# Patient Record
Sex: FEMALE | Race: WHITE | NOT HISPANIC OR LATINO | Employment: OTHER | URBAN - NONMETROPOLITAN AREA
[De-identification: names, ages, dates, MRNs, and addresses within clinical notes are randomized per-mention and may not be internally consistent; named-entity substitution may affect disease eponyms.]

---

## 2017-09-12 ENCOUNTER — APPOINTMENT (OUTPATIENT)
Dept: GENERAL RADIOLOGY | Facility: HOSPITAL | Age: 37
End: 2017-09-12

## 2017-09-12 ENCOUNTER — HOSPITAL ENCOUNTER (EMERGENCY)
Facility: HOSPITAL | Age: 37
Discharge: HOME | End: 2017-09-12
Attending: EMERGENCY MEDICINE | Admitting: EMERGENCY MEDICINE

## 2017-09-12 VITALS
WEIGHT: 130 LBS | BODY MASS INDEX: 22.2 KG/M2 | OXYGEN SATURATION: 100 % | RESPIRATION RATE: 17 BRPM | SYSTOLIC BLOOD PRESSURE: 124 MMHG | HEIGHT: 64 IN | TEMPERATURE: 98.4 F | DIASTOLIC BLOOD PRESSURE: 77 MMHG | HEART RATE: 74 BPM

## 2017-09-12 DIAGNOSIS — M54.12 CERVICAL RADICULOPATHY: Primary | ICD-10-CM

## 2017-09-12 PROCEDURE — 72050 X-RAY EXAM NECK SPINE 4/5VWS: CPT

## 2017-09-12 PROCEDURE — 99282 EMERGENCY DEPT VISIT SF MDM: CPT

## 2017-09-12 PROCEDURE — 73030 X-RAY EXAM OF SHOULDER: CPT | Performed by: RADIOLOGY

## 2017-09-12 PROCEDURE — 73030 X-RAY EXAM OF SHOULDER: CPT

## 2017-09-12 PROCEDURE — 72052 X-RAY EXAM NECK SPINE 6/>VWS: CPT | Performed by: RADIOLOGY

## 2017-09-12 RX ORDER — MONTELUKAST SODIUM 10 MG/1
10 TABLET ORAL NIGHTLY
COMMUNITY
End: 2019-09-06

## 2017-09-12 RX ORDER — HYDROCODONE BITARTRATE AND ACETAMINOPHEN 7.5; 325 MG/1; MG/1
1 TABLET ORAL EVERY 6 HOURS PRN
COMMUNITY
End: 2022-12-15 | Stop reason: ALTCHOICE

## 2017-09-12 RX ORDER — KETOROLAC TROMETHAMINE 10 MG/1
10 TABLET, FILM COATED ORAL EVERY 6 HOURS PRN
Qty: 20 TABLET | Refills: 0 | Status: SHIPPED | OUTPATIENT
Start: 2017-09-12 | End: 2018-07-05

## 2017-09-12 RX ORDER — OMEPRAZOLE 40 MG/1
40 CAPSULE, DELAYED RELEASE ORAL DAILY
COMMUNITY
End: 2022-09-09

## 2017-09-12 RX ORDER — AMITRIPTYLINE HYDROCHLORIDE 25 MG/1
25 TABLET, FILM COATED ORAL NIGHTLY
COMMUNITY
End: 2019-09-06

## 2017-09-12 RX ORDER — PROMETHAZINE HYDROCHLORIDE 12.5 MG/1
12.5 TABLET ORAL EVERY 6 HOURS PRN
COMMUNITY
End: 2018-11-16 | Stop reason: SDUPTHER

## 2017-09-12 RX ORDER — CYCLOBENZAPRINE HCL 10 MG
10 TABLET ORAL 3 TIMES DAILY PRN
COMMUNITY
End: 2019-09-06

## 2017-09-12 RX ORDER — CETIRIZINE HYDROCHLORIDE 10 MG/1
10 TABLET ORAL DAILY
COMMUNITY
End: 2019-09-06

## 2017-09-12 NOTE — DISCHARGE INSTRUCTIONS

## 2017-09-12 NOTE — ED PROVIDER NOTES
Subjective   Patient is a 37 y.o. female presenting with shoulder problem.   Shoulder Problem   Location:  Shoulder  Shoulder location:  R shoulder  Injury: no    Pain details:     Quality:  Aching    Severity:  Moderate    Onset quality:  Gradual    Duration:  1 day    Timing:  Constant    Progression:  Unchanged  Dislocation: no    Foreign body present:  No foreign bodies  Relieved by:  Nothing  Worsened by:  Nothing  Associated symptoms: decreased range of motion    Associated symptoms: no back pain and no fever    Associated symptoms comment:  She denies any injury.  She states that she does hurt with movement.  She has a history of tendinitis in the shoulder.  She states that when she raises her arm she does get numbness down to her forearm region now.  She's never had a history of neck injury.  No other complaints.  No fevers.  No neck injury.      Review of Systems   Constitutional: Negative.  Negative for fever.   HENT: Negative.    Respiratory: Negative.    Cardiovascular: Negative.  Negative for chest pain.   Gastrointestinal: Negative.  Negative for abdominal pain.   Endocrine: Negative.    Genitourinary: Negative.  Negative for dysuria.   Musculoskeletal: Negative for back pain.   Skin: Negative.    Neurological: Negative.    Psychiatric/Behavioral: Negative.    All other systems reviewed and are negative.      Past Medical History:   Diagnosis Date   • Scoliosis        Allergies   Allergen Reactions   • Anaprox [Naproxen Sodium]    • Codeine    • Combivent [Ipratropium-Albuterol]    • Compazine [Prochlorperazine Edisylate]    • Dilantin [Phenytoin Sodium Extended]    • Divalproex Sodium    • Erythromycin    • Fluticasone    • Imitrex [Sumatriptan]    • Keflex [Cephalexin]    • Morphine And Related    • Naproxen    • Penicillins    • Protonix [Pantoprazole Sodium]    • Tetracyclines & Related    • Topamax [Topiramate]        History reviewed. No pertinent surgical history.    History reviewed. No  pertinent family history.    Social History     Social History   • Marital status: Single     Spouse name: N/A   • Number of children: N/A   • Years of education: N/A     Social History Main Topics   • Smoking status: Never Smoker   • Smokeless tobacco: None   • Alcohol use No   • Drug use: No   • Sexual activity: Defer     Other Topics Concern   • None     Social History Narrative   • None           Objective   Physical Exam   Constitutional: She is oriented to person, place, and time. She appears well-developed and well-nourished. No distress.   HENT:   Head: Normocephalic and atraumatic.   Right Ear: External ear normal.   Left Ear: External ear normal.   Nose: Nose normal.   Eyes: Conjunctivae and EOM are normal. Pupils are equal, round, and reactive to light.   Neck: Normal range of motion. Neck supple. No JVD present. No tracheal deviation present.   Cardiovascular: Normal rate, regular rhythm and normal heart sounds.    No murmur heard.  Pulmonary/Chest: Effort normal and breath sounds normal. No respiratory distress. She has no wheezes.   Abdominal: Soft. Bowel sounds are normal. There is no tenderness.   Musculoskeletal: Normal range of motion. She exhibits tenderness (over her right anterior shoulder.). She exhibits no edema or deformity.   Neurological: She is alert and oriented to person, place, and time. No cranial nerve deficit.   Skin: Skin is warm and dry. No rash noted. She is not diaphoretic. No erythema. No pallor.   Psychiatric: She has a normal mood and affect. Her behavior is normal. Thought content normal.   Nursing note and vitals reviewed.      Procedures         ED Course  ED Course                  MDM  Number of Diagnoses or Management Options  new and requires workup     Amount and/or Complexity of Data Reviewed  Tests in the radiology section of CPT®: ordered and reviewed  Discuss the patient with other providers: yes  Independent visualization of images, tracings, or specimens:  yes    Risk of Complications, Morbidity, and/or Mortality  Presenting problems: moderate        Final diagnoses:   Cervical radiculopathy            JUAN ANTONIO Monroy  09/12/17 5259

## 2017-09-12 NOTE — ED NOTES
Patient reports right shoulder pain for about a week. Denies injury of any kind. Has a history of tendonitis in the shoulder. States her arm goes numb sometimes from the neck to just below her elbow. Rates pain at 8. Family at bedside.     Carlotta Diaz RN  09/12/17 1737

## 2018-07-05 ENCOUNTER — HOSPITAL ENCOUNTER (EMERGENCY)
Facility: HOSPITAL | Age: 38
Discharge: HOME | End: 2018-07-05
Admitting: FAMILY MEDICINE

## 2018-07-05 VITALS
HEIGHT: 64 IN | BODY MASS INDEX: 21.68 KG/M2 | WEIGHT: 127 LBS | SYSTOLIC BLOOD PRESSURE: 105 MMHG | OXYGEN SATURATION: 99 % | DIASTOLIC BLOOD PRESSURE: 68 MMHG | TEMPERATURE: 98 F | HEART RATE: 70 BPM | RESPIRATION RATE: 18 BRPM

## 2018-07-05 DIAGNOSIS — N39.0 ACUTE UTI: Primary | ICD-10-CM

## 2018-07-05 LAB
ALBUMIN SERPL-MCNC: 4.4 G/DL (ref 3.5–5)
ALBUMIN/GLOB SERPL: 1.7 G/DL (ref 1.5–2.5)
ALP SERPL-CCNC: 83 U/L (ref 35–104)
ALT SERPL W P-5'-P-CCNC: 15 U/L (ref 10–36)
ANION GAP SERPL CALCULATED.3IONS-SCNC: 4.2 MMOL/L (ref 3.6–11.2)
AST SERPL-CCNC: 15 U/L (ref 10–30)
BACTERIA UR QL AUTO: ABNORMAL /HPF
BASOPHILS # BLD AUTO: 0.01 10*3/MM3 (ref 0–0.3)
BASOPHILS NFR BLD AUTO: 0.1 % (ref 0–2)
BILIRUB SERPL-MCNC: 0.4 MG/DL (ref 0.2–1.8)
BILIRUB UR QL STRIP: NEGATIVE
BUN BLD-MCNC: 6 MG/DL (ref 7–21)
BUN/CREAT SERPL: 8.3 (ref 7–25)
CALCIUM SPEC-SCNC: 9.3 MG/DL (ref 7.7–10)
CHLORIDE SERPL-SCNC: 108 MMOL/L (ref 99–112)
CLARITY UR: ABNORMAL
CO2 SERPL-SCNC: 27.8 MMOL/L (ref 24.3–31.9)
COLOR UR: ABNORMAL
CREAT BLD-MCNC: 0.72 MG/DL (ref 0.43–1.29)
DEPRECATED RDW RBC AUTO: 43.7 FL (ref 37–54)
EOSINOPHIL # BLD AUTO: 0.01 10*3/MM3 (ref 0–0.7)
EOSINOPHIL NFR BLD AUTO: 0.1 % (ref 0–5)
ERYTHROCYTE [DISTWIDTH] IN BLOOD BY AUTOMATED COUNT: 13.4 % (ref 11.5–14.5)
GFR SERPL CREATININE-BSD FRML MDRD: 92 ML/MIN/1.73
GLOBULIN UR ELPH-MCNC: 2.6 GM/DL
GLUCOSE BLD-MCNC: 111 MG/DL (ref 70–110)
GLUCOSE UR STRIP-MCNC: NEGATIVE MG/DL
HCT VFR BLD AUTO: 38.1 % (ref 37–47)
HGB BLD-MCNC: 12.4 G/DL (ref 12–16)
HGB UR QL STRIP.AUTO: ABNORMAL
HYALINE CASTS UR QL AUTO: ABNORMAL /LPF
IMM GRANULOCYTES # BLD: 0.06 10*3/MM3 (ref 0–0.03)
IMM GRANULOCYTES NFR BLD: 0.3 % (ref 0–0.5)
KETONES UR QL STRIP: ABNORMAL
LEUKOCYTE ESTERASE UR QL STRIP.AUTO: ABNORMAL
LYMPHOCYTES # BLD AUTO: 1.03 10*3/MM3 (ref 1–3)
LYMPHOCYTES NFR BLD AUTO: 6 % (ref 21–51)
MCH RBC QN AUTO: 29.9 PG (ref 27–33)
MCHC RBC AUTO-ENTMCNC: 32.5 G/DL (ref 33–37)
MCV RBC AUTO: 91.8 FL (ref 80–94)
MONOCYTES # BLD AUTO: 0.53 10*3/MM3 (ref 0.1–0.9)
MONOCYTES NFR BLD AUTO: 3.1 % (ref 0–10)
NEUTROPHILS # BLD AUTO: 15.67 10*3/MM3 (ref 1.4–6.5)
NEUTROPHILS NFR BLD AUTO: 90.4 % (ref 30–70)
NITRITE UR QL STRIP: NEGATIVE
OSMOLALITY SERPL CALC.SUM OF ELEC: 277.7 MOSM/KG (ref 273–305)
PH UR STRIP.AUTO: <=5 [PH] (ref 5–8)
PLATELET # BLD AUTO: 291 10*3/MM3 (ref 130–400)
PMV BLD AUTO: 10 FL (ref 6–10)
POTASSIUM BLD-SCNC: 4 MMOL/L (ref 3.5–5.3)
PROT SERPL-MCNC: 7 G/DL (ref 6–8)
PROT UR QL STRIP: ABNORMAL
RBC # BLD AUTO: 4.15 10*6/MM3 (ref 4.2–5.4)
RBC # UR: ABNORMAL /HPF
REF LAB TEST METHOD: ABNORMAL
SODIUM BLD-SCNC: 140 MMOL/L (ref 135–153)
SP GR UR STRIP: 1.02 (ref 1–1.03)
SQUAMOUS #/AREA URNS HPF: ABNORMAL /HPF
UROBILINOGEN UR QL STRIP: ABNORMAL
WBC NRBC COR # BLD: 17.31 10*3/MM3 (ref 4.5–12.5)
WBC UR QL AUTO: ABNORMAL /HPF

## 2018-07-05 PROCEDURE — 80053 COMPREHEN METABOLIC PANEL: CPT | Performed by: NURSE PRACTITIONER

## 2018-07-05 PROCEDURE — 85025 COMPLETE CBC W/AUTO DIFF WBC: CPT | Performed by: NURSE PRACTITIONER

## 2018-07-05 PROCEDURE — 99284 EMERGENCY DEPT VISIT MOD MDM: CPT

## 2018-07-05 PROCEDURE — P9612 CATHETERIZE FOR URINE SPEC: HCPCS

## 2018-07-05 PROCEDURE — 36415 COLL VENOUS BLD VENIPUNCTURE: CPT

## 2018-07-05 PROCEDURE — 81001 URINALYSIS AUTO W/SCOPE: CPT | Performed by: NURSE PRACTITIONER

## 2018-07-05 RX ORDER — BUTALBITAL, ACETAMINOPHEN AND CAFFEINE 50; 325; 40 MG/1; MG/1; MG/1
1 TABLET ORAL EVERY 4 HOURS PRN
COMMUNITY
End: 2019-09-06

## 2018-07-05 RX ORDER — LEVOFLOXACIN 500 MG/1
500 TABLET, FILM COATED ORAL DAILY
Qty: 9 TABLET | Refills: 0 | Status: SHIPPED | OUTPATIENT
Start: 2018-07-06 | End: 2018-07-15

## 2018-07-05 RX ORDER — LEVOFLOXACIN 750 MG/1
750 TABLET ORAL ONCE
Status: COMPLETED | OUTPATIENT
Start: 2018-07-05 | End: 2018-07-05

## 2018-07-05 RX ADMIN — LEVOFLOXACIN 750 MG: 750 TABLET, FILM COATED ORAL at 18:42

## 2018-07-05 NOTE — ED NOTES
Straight Cath done to obtain UA sample; 2ml cloudy urine return noted; patient tolerated well.     Viola Patiño RN  07/05/18 0378

## 2018-07-23 NOTE — ED PROVIDER NOTES
Subjective     History provided by:  Patient   used: No    Female  Problem   Primary symptoms include dysuria. The maximum temperature recorded prior to her arrival was 101 to 101.9 F. The fever has been present for 1 to 2 days. This is a recurrent problem. The current episode started more than 2 days ago. The problem occurs constantly. The problem has not changed since onset.The symptoms occur during urination. She is not pregnant. The discharge was normal. Associated symptoms include nausea. Pertinent negatives include no anorexia, no abdominal swelling, no abdominal pain, no diarrhea, no frequency, no light-headedness and no dizziness. She has tried acetaminophen for the symptoms. The treatment provided no relief. Sexual activity: sexually active. She uses nothing for contraception. Associated medical issues do not include PID, herpes simplex, perineal abscess, vaginosis, hypermenorrhea, ovarian cysts, endometriosis, ectopic pregnancy,  section or miscarriage.       Review of Systems   Constitutional: Positive for fever.   HENT: Negative.    Eyes: Negative.    Respiratory: Negative.    Gastrointestinal: Positive for nausea. Negative for abdominal pain, anorexia and diarrhea.   Endocrine: Negative.    Genitourinary: Positive for dysuria. Negative for frequency.   Musculoskeletal: Negative.    Skin: Negative.    Allergic/Immunologic: Negative.    Neurological: Negative.  Negative for dizziness and light-headedness.   Hematological: Negative.    Psychiatric/Behavioral: Negative.        Past Medical History:   Diagnosis Date   • Injury of back    • Scoliosis        Allergies   Allergen Reactions   • Anaprox [Naproxen Sodium]    • Codeine    • Combivent [Ipratropium-Albuterol]    • Compazine [Prochlorperazine Edisylate]    • Dilantin [Phenytoin Sodium Extended]    • Divalproex Sodium    • Erythromycin    • Fluticasone    • Imitrex [Sumatriptan]    • Keflex [Cephalexin]    • Morphine And  Related    • Naproxen    • Penicillins    • Protonix [Pantoprazole Sodium]    • Tetracyclines & Related    • Topamax [Topiramate]        History reviewed. No pertinent surgical history.    History reviewed. No pertinent family history.    Social History     Social History   • Marital status: Single     Social History Main Topics   • Smoking status: Current Every Day Smoker     Packs/day: 0.50     Types: Cigarettes   • Smokeless tobacco: Never Used   • Alcohol use No   • Drug use: No   • Sexual activity: Defer     Other Topics Concern   • Not on file           Objective   Physical Exam   Constitutional: She is oriented to person, place, and time. She appears well-developed and well-nourished.   HENT:   Head: Normocephalic.   Right Ear: External ear normal.   Left Ear: External ear normal.   Mouth/Throat: Oropharynx is clear and moist.   Eyes: Pupils are equal, round, and reactive to light. EOM are normal.   Neck: Normal range of motion. Neck supple.   Cardiovascular: Normal rate, regular rhythm and normal heart sounds.    Pulmonary/Chest: Effort normal and breath sounds normal.   Abdominal: Soft. Bowel sounds are normal.   Musculoskeletal: Normal range of motion.   Neurological: She is alert and oriented to person, place, and time.   Skin: Skin is warm and dry. Capillary refill takes less than 2 seconds.   Psychiatric: She has a normal mood and affect. Her behavior is normal.   Nursing note and vitals reviewed.      Procedures           ED Course                  MDM      Final diagnoses:   Acute UTI            Elio Fenton, APRN  07/22/18 4157

## 2018-09-08 ENCOUNTER — HOSPITAL ENCOUNTER (EMERGENCY)
Facility: HOSPITAL | Age: 38
Discharge: HOME | End: 2018-09-08
Attending: EMERGENCY MEDICINE | Admitting: EMERGENCY MEDICINE

## 2018-09-08 VITALS
BODY MASS INDEX: 22.2 KG/M2 | HEART RATE: 68 BPM | WEIGHT: 130 LBS | RESPIRATION RATE: 18 BRPM | HEIGHT: 64 IN | DIASTOLIC BLOOD PRESSURE: 72 MMHG | OXYGEN SATURATION: 100 % | TEMPERATURE: 98.1 F | SYSTOLIC BLOOD PRESSURE: 112 MMHG

## 2018-09-08 DIAGNOSIS — M25.532 LEFT WRIST PAIN: ICD-10-CM

## 2018-09-08 DIAGNOSIS — M25.511 RIGHT SHOULDER PAIN, UNSPECIFIED CHRONICITY: Primary | ICD-10-CM

## 2018-09-08 PROCEDURE — 25010000002 KETOROLAC TROMETHAMINE PER 15 MG: Performed by: PHYSICIAN ASSISTANT

## 2018-09-08 PROCEDURE — 96372 THER/PROPH/DIAG INJ SC/IM: CPT

## 2018-09-08 PROCEDURE — 99283 EMERGENCY DEPT VISIT LOW MDM: CPT

## 2018-09-08 PROCEDURE — 25010000002 METHYLPREDNISOLONE PER 125 MG: Performed by: PHYSICIAN ASSISTANT

## 2018-09-08 RX ORDER — METHYLPREDNISOLONE SODIUM SUCCINATE 125 MG/2ML
80 INJECTION, POWDER, LYOPHILIZED, FOR SOLUTION INTRAMUSCULAR; INTRAVENOUS ONCE
Status: COMPLETED | OUTPATIENT
Start: 2018-09-08 | End: 2018-09-08

## 2018-09-08 RX ORDER — KETOROLAC TROMETHAMINE 30 MG/ML
60 INJECTION, SOLUTION INTRAMUSCULAR; INTRAVENOUS ONCE
Status: COMPLETED | OUTPATIENT
Start: 2018-09-08 | End: 2018-09-08

## 2018-09-08 RX ADMIN — METHYLPREDNISOLONE SODIUM SUCCINATE 80 MG: 125 INJECTION, POWDER, FOR SOLUTION INTRAMUSCULAR; INTRAVENOUS at 10:32

## 2018-09-08 RX ADMIN — KETOROLAC TROMETHAMINE 60 MG: 60 INJECTION, SOLUTION INTRAMUSCULAR at 10:32

## 2018-09-08 NOTE — ED PROVIDER NOTES
Subjective     History provided by:  Patient   used: No    Upper Extremity Issue   Location:  Shoulder and wrist  Shoulder location:  R shoulder  Wrist location:  L wrist  Injury: no    Pain details:     Quality:  Aching    Radiates to:  Does not radiate    Severity:  Mild    Onset quality:  Sudden    Duration:  1 week    Timing:  Intermittent    Progression:  Waxing and waning  Dislocation: no    Prior injury to area:  No  Relieved by:  None tried  Worsened by:  Movement  Ineffective treatments:  None tried  Associated symptoms: no decreased range of motion, no fever, no numbness, no stiffness, no swelling and no tingling    Risk factors: no concern for non-accidental trauma and no known bone disorder        Review of Systems   Constitutional: Negative for activity change, chills and fever.   HENT: Negative for congestion, rhinorrhea and sore throat.    Eyes: Negative for pain and redness.   Respiratory: Negative for cough, shortness of breath and wheezing.    Cardiovascular: Negative for chest pain.   Gastrointestinal: Negative for abdominal distention, diarrhea, nausea and vomiting.   Endocrine: Negative for polyphagia and polyuria.   Genitourinary: Negative for decreased urine volume, difficulty urinating and dysuria.   Musculoskeletal: Negative for arthralgias, myalgias and stiffness.   Skin: Negative for rash and wound.   Allergic/Immunologic: Negative for food allergies and immunocompromised state.   Neurological: Negative for dizziness and headaches.   Hematological: Negative for adenopathy. Does not bruise/bleed easily.   Psychiatric/Behavioral: Negative for agitation and confusion.   All other systems reviewed and are negative.      Past Medical History:   Diagnosis Date   • Injury of back    • Scoliosis        Allergies   Allergen Reactions   • Anaprox [Naproxen Sodium]    • Codeine    • Combivent [Ipratropium-Albuterol]    • Compazine [Prochlorperazine Edisylate]    • Dilantin  [Phenytoin Sodium Extended]    • Divalproex Sodium    • Erythromycin    • Fluticasone    • Imitrex [Sumatriptan]    • Keflex [Cephalexin]    • Morphine And Related    • Naproxen    • Penicillins    • Protonix [Pantoprazole Sodium]    • Robaxin [Methocarbamol] Hives   • Tetracyclines & Related    • Topamax [Topiramate]        No past surgical history on file.    No family history on file.    Social History     Social History   • Marital status: Single     Social History Main Topics   • Smoking status: Current Every Day Smoker     Packs/day: 0.50     Types: Cigarettes   • Smokeless tobacco: Never Used   • Alcohol use No   • Drug use: No   • Sexual activity: Defer     Other Topics Concern   • Not on file           Objective   Physical Exam   Constitutional: She is oriented to person, place, and time. She appears well-developed and well-nourished.   HENT:   Head: Normocephalic and atraumatic.   Eyes: Pupils are equal, round, and reactive to light. EOM are normal.   Neck: Normal range of motion. Neck supple.   Cardiovascular: Normal rate, regular rhythm and normal heart sounds.    Pulmonary/Chest: Effort normal and breath sounds normal.   Abdominal: Soft. Bowel sounds are normal.   Musculoskeletal: Normal range of motion.        Right shoulder: She exhibits pain. She exhibits normal range of motion, no tenderness and no bony tenderness.        Left wrist: She exhibits tenderness. She exhibits normal range of motion and no bony tenderness.   Neurological: She is alert and oriented to person, place, and time.   Skin: Skin is warm and dry.   Psychiatric: She has a normal mood and affect. Her behavior is normal. Judgment and thought content normal.   Nursing note and vitals reviewed.      Splint - Cast - Strapping  Date/Time: 9/8/2018 10:40 AM  Performed by: DAVID TOLBERT  Authorized by: GREG CARDONA     Consent:     Consent obtained:  Verbal    Consent given by:  Patient    Risks discussed:  Discoloration,  numbness, pain and swelling    Alternatives discussed:  No treatment  Pre-procedure details:     Sensation:  Normal  Procedure details:     Laterality:  Left    Location:  Wrist    Wrist:  L wrist    Splint type:  Wrist    Supplies:  Prefabricated splint  Post-procedure details:     Pain:  Improved    Sensation:  Normal    Patient tolerance of procedure:  Tolerated well, no immediate complications               ED Course                  MDM  Number of Diagnoses or Management Options     Amount and/or Complexity of Data Reviewed  Clinical lab tests: reviewed and ordered  Tests in the radiology section of CPT®: ordered and reviewed  Tests in the medicine section of CPT®: ordered and reviewed    Patient Progress  Patient progress: stable        Final diagnoses:   Right shoulder pain, unspecified chronicity   Left wrist pain            Román Kirkland PA  09/08/18 2745

## 2018-09-08 NOTE — ED NOTES
Pt states that her left wrist and her right shoulder has been in pain for several days. She states that is hurts worse on activity.      Kami Cabrera RN  09/08/18 5104

## 2018-11-06 ENCOUNTER — OFFICE VISIT (OUTPATIENT)
Dept: SURGERY | Facility: CLINIC | Age: 38
End: 2018-11-06

## 2018-11-06 VITALS
HEART RATE: 71 BPM | SYSTOLIC BLOOD PRESSURE: 117 MMHG | BODY MASS INDEX: 22.2 KG/M2 | WEIGHT: 130 LBS | HEIGHT: 64 IN | DIASTOLIC BLOOD PRESSURE: 81 MMHG

## 2018-11-06 DIAGNOSIS — R11.14 BILIOUS VOMITING WITH NAUSEA: ICD-10-CM

## 2018-11-06 DIAGNOSIS — R10.13 EPIGASTRIC PAIN: Primary | ICD-10-CM

## 2018-11-06 PROCEDURE — 99204 OFFICE O/P NEW MOD 45 MIN: CPT | Performed by: SURGERY

## 2018-11-06 RX ORDER — HYDROCODONE BITARTRATE AND ACETAMINOPHEN 7.5; 325 MG/1; MG/1
TABLET ORAL
COMMUNITY
Start: 2017-04-05 | End: 2019-09-06

## 2018-11-06 NOTE — H&P (VIEW-ONLY)
Subjective   Bibi Burnett is a 38 y.o. female.     History of Present Illness She has had several days of stabbing pains in the epigastrium. She gets nauseated with eating anything. She had a similar pains and had a normal EGD in [place]. No work up for gallbladder. No bowel issues or past abdominal surgery. She is on chronic narcotics for her back and is taking carafate and antacid.     The following portions of the patient's history were reviewed and updated as appropriate: current medications, past family history, past medical history, past social history, past surgical history and problem list.    Review of Systems   Constitutional: Negative for activity change, appetite change, chills, fever and unexpected weight change.   HENT: Negative for congestion, facial swelling and sore throat.    Eyes: Negative for photophobia and visual disturbance.   Respiratory: Negative for chest tightness, shortness of breath and wheezing.    Cardiovascular: Negative for chest pain, palpitations and leg swelling.   Gastrointestinal: Positive for abdominal pain, nausea and vomiting. Negative for abdominal distention, anal bleeding, blood in stool, constipation, diarrhea and rectal pain.   Endocrine: Negative for cold intolerance, heat intolerance, polydipsia and polyuria.   Genitourinary: Negative for difficulty urinating, dysuria, flank pain and urgency.   Musculoskeletal: Negative for back pain and myalgias.   Skin: Negative for rash and wound.   Allergic/Immunologic: Negative for immunocompromised state.   Neurological: Negative for dizziness, seizures, syncope, light-headedness, numbness and headaches.   Hematological: Negative for adenopathy. Does not bruise/bleed easily.   Psychiatric/Behavioral: Negative for behavioral problems and confusion. The patient is not nervous/anxious.        Objective   Physical Exam   Constitutional: She is oriented to person, place, and time. She appears well-developed and well-nourished. She  does not appear ill. No distress.       HENT:   Head: Normocephalic. Head is without laceration. Hair is normal.   Right Ear: Hearing and ear canal normal.   Left Ear: Hearing and ear canal normal.   Nose: Nose normal. No sinus tenderness. No epistaxis. Right sinus exhibits no maxillary sinus tenderness and no frontal sinus tenderness. Left sinus exhibits no maxillary sinus tenderness and no frontal sinus tenderness.   Eyes: Pupils are equal, round, and reactive to light. Conjunctivae and lids are normal.   Neck: Normal range of motion. No JVD present. No tracheal tenderness present. No tracheal deviation present. No thyroid mass and no thyromegaly present.   Cardiovascular: Normal rate and regular rhythm.  Exam reveals no gallop.    No murmur heard.  Pulmonary/Chest: Effort normal and breath sounds normal. No stridor. She has no wheezes. She exhibits no tenderness.   Abdominal: Soft. Bowel sounds are normal. She exhibits no distension, no ascites and no mass. There is tenderness. There is no rebound and no guarding. No hernia.   Musculoskeletal: She exhibits no edema or deformity.   Lymphadenopathy:     She has no cervical adenopathy.     She has no axillary adenopathy.        Right: No inguinal and no supraclavicular adenopathy present.        Left: No inguinal and no supraclavicular adenopathy present.   Neurological: She is alert and oriented to person, place, and time. She exhibits normal muscle tone.   Skin: Skin is warm, dry and intact. No rash noted. No erythema. No pallor.   Psychiatric: She has a normal mood and affect. Her behavior is normal. Thought content normal.   Vitals reviewed.      Assessment/Plan   Bibi was seen today for burning in stomach.    Diagnoses and all orders for this visit:    Epigastric pain    Bilious vomiting with nausea    EGD and possibly lap agustin if tests are abnormal.

## 2018-11-06 NOTE — PROGRESS NOTES
Subjective   Bibi Burnett is a 38 y.o. female.     History of Present Illness She has had several days of stabbing pains in the epigastrium. She gets nauseated with eating anything. She had a similar pains and had a normal EGD in [place]. No work up for gallbladder. No bowel issues or past abdominal surgery. She is on chronic narcotics for her back and is taking carafate and antacid.     The following portions of the patient's history were reviewed and updated as appropriate: current medications, past family history, past medical history, past social history, past surgical history and problem list.    Review of Systems   Constitutional: Negative for activity change, appetite change, chills, fever and unexpected weight change.   HENT: Negative for congestion, facial swelling and sore throat.    Eyes: Negative for photophobia and visual disturbance.   Respiratory: Negative for chest tightness, shortness of breath and wheezing.    Cardiovascular: Negative for chest pain, palpitations and leg swelling.   Gastrointestinal: Positive for abdominal pain, nausea and vomiting. Negative for abdominal distention, anal bleeding, blood in stool, constipation, diarrhea and rectal pain.   Endocrine: Negative for cold intolerance, heat intolerance, polydipsia and polyuria.   Genitourinary: Negative for difficulty urinating, dysuria, flank pain and urgency.   Musculoskeletal: Negative for back pain and myalgias.   Skin: Negative for rash and wound.   Allergic/Immunologic: Negative for immunocompromised state.   Neurological: Negative for dizziness, seizures, syncope, light-headedness, numbness and headaches.   Hematological: Negative for adenopathy. Does not bruise/bleed easily.   Psychiatric/Behavioral: Negative for behavioral problems and confusion. The patient is not nervous/anxious.        Objective   Physical Exam   Constitutional: She is oriented to person, place, and time. She appears well-developed and well-nourished. She  does not appear ill. No distress.       HENT:   Head: Normocephalic. Head is without laceration. Hair is normal.   Right Ear: Hearing and ear canal normal.   Left Ear: Hearing and ear canal normal.   Nose: Nose normal. No sinus tenderness. No epistaxis. Right sinus exhibits no maxillary sinus tenderness and no frontal sinus tenderness. Left sinus exhibits no maxillary sinus tenderness and no frontal sinus tenderness.   Eyes: Pupils are equal, round, and reactive to light. Conjunctivae and lids are normal.   Neck: Normal range of motion. No JVD present. No tracheal tenderness present. No tracheal deviation present. No thyroid mass and no thyromegaly present.   Cardiovascular: Normal rate and regular rhythm.  Exam reveals no gallop.    No murmur heard.  Pulmonary/Chest: Effort normal and breath sounds normal. No stridor. She has no wheezes. She exhibits no tenderness.   Abdominal: Soft. Bowel sounds are normal. She exhibits no distension, no ascites and no mass. There is tenderness. There is no rebound and no guarding. No hernia.   Musculoskeletal: She exhibits no edema or deformity.   Lymphadenopathy:     She has no cervical adenopathy.     She has no axillary adenopathy.        Right: No inguinal and no supraclavicular adenopathy present.        Left: No inguinal and no supraclavicular adenopathy present.   Neurological: She is alert and oriented to person, place, and time. She exhibits normal muscle tone.   Skin: Skin is warm, dry and intact. No rash noted. No erythema. No pallor.   Psychiatric: She has a normal mood and affect. Her behavior is normal. Thought content normal.   Vitals reviewed.      Assessment/Plan   Bibi was seen today for burning in stomach.    Diagnoses and all orders for this visit:    Epigastric pain    Bilious vomiting with nausea    EGD and possibly lap agustin if tests are abnormal.

## 2018-11-09 ENCOUNTER — HOSPITAL ENCOUNTER (OUTPATIENT)
Dept: ULTRASOUND IMAGING | Facility: HOSPITAL | Age: 38
Discharge: HOME | End: 2018-11-09
Attending: SURGERY | Admitting: SURGERY

## 2018-11-09 DIAGNOSIS — R10.13 EPIGASTRIC PAIN: ICD-10-CM

## 2018-11-09 DIAGNOSIS — R11.14 BILIOUS VOMITING WITH NAUSEA: ICD-10-CM

## 2018-11-09 PROCEDURE — 76705 ECHO EXAM OF ABDOMEN: CPT

## 2018-11-09 PROCEDURE — 76705 ECHO EXAM OF ABDOMEN: CPT | Performed by: RADIOLOGY

## 2018-11-13 ENCOUNTER — PREP FOR SURGERY (OUTPATIENT)
Dept: OTHER | Facility: HOSPITAL | Age: 38
End: 2018-11-13

## 2018-11-14 ENCOUNTER — TELEPHONE (OUTPATIENT)
Dept: SURGERY | Facility: CLINIC | Age: 38
End: 2018-11-14

## 2018-11-14 NOTE — TELEPHONE ENCOUNTER
Patient had been seen for a EGD and after she had a US of the gallbladder her case request was modified for both procedures. I called the patient to ask if she could come in at 7 on 11/15 instead of 930. Patient said this would be fine, and that she had received a call from the hospital stating that her PAT would be done same day.

## 2018-11-15 ENCOUNTER — ANESTHESIA (OUTPATIENT)
Dept: PERIOP | Facility: HOSPITAL | Age: 38
End: 2018-11-15

## 2018-11-15 ENCOUNTER — HOSPITAL ENCOUNTER (OUTPATIENT)
Facility: HOSPITAL | Age: 38
Setting detail: HOSPITAL OUTPATIENT SURGERY
Discharge: HOME | End: 2018-11-15
Attending: SURGERY | Admitting: SURGERY

## 2018-11-15 ENCOUNTER — ANESTHESIA EVENT (OUTPATIENT)
Dept: PERIOP | Facility: HOSPITAL | Age: 38
End: 2018-11-15

## 2018-11-15 VITALS
HEART RATE: 82 BPM | BODY MASS INDEX: 22.88 KG/M2 | WEIGHT: 134 LBS | TEMPERATURE: 97.7 F | RESPIRATION RATE: 16 BRPM | DIASTOLIC BLOOD PRESSURE: 76 MMHG | HEIGHT: 64 IN | OXYGEN SATURATION: 99 % | SYSTOLIC BLOOD PRESSURE: 116 MMHG

## 2018-11-15 DIAGNOSIS — R11.14 BILIOUS VOMITING WITH NAUSEA: ICD-10-CM

## 2018-11-15 DIAGNOSIS — R10.13 EPIGASTRIC PAIN: ICD-10-CM

## 2018-11-15 LAB
B-HCG UR QL: NEGATIVE
INTERNAL NEGATIVE CONTROL: NEGATIVE
INTERNAL POSITIVE CONTROL: POSITIVE
Lab: NORMAL

## 2018-11-15 PROCEDURE — 47562 LAPAROSCOPIC CHOLECYSTECTOMY: CPT | Performed by: SURGERY

## 2018-11-15 PROCEDURE — 25010000002 MIDAZOLAM PER 1 MG: Performed by: NURSE ANESTHETIST, CERTIFIED REGISTERED

## 2018-11-15 PROCEDURE — 43239 EGD BIOPSY SINGLE/MULTIPLE: CPT | Performed by: SURGERY

## 2018-11-15 PROCEDURE — 25010000002 PROPOFOL 10 MG/ML EMULSION: Performed by: NURSE ANESTHETIST, CERTIFIED REGISTERED

## 2018-11-15 PROCEDURE — 25010000002 NEOSTIGMINE 10 MG/10ML SOLUTION: Performed by: NURSE ANESTHETIST, CERTIFIED REGISTERED

## 2018-11-15 PROCEDURE — 25010000002 FENTANYL CITRATE (PF) 100 MCG/2ML SOLUTION: Performed by: NURSE ANESTHETIST, CERTIFIED REGISTERED

## 2018-11-15 PROCEDURE — 81025 URINE PREGNANCY TEST: CPT | Performed by: ANESTHESIOLOGY

## 2018-11-15 PROCEDURE — 87081 CULTURE SCREEN ONLY: CPT | Performed by: SURGERY

## 2018-11-15 RX ORDER — FENTANYL CITRATE 50 UG/ML
INJECTION, SOLUTION INTRAMUSCULAR; INTRAVENOUS AS NEEDED
Status: DISCONTINUED | OUTPATIENT
Start: 2018-11-15 | End: 2018-11-15 | Stop reason: SURG

## 2018-11-15 RX ORDER — HYDROCODONE BITARTRATE AND ACETAMINOPHEN 5; 325 MG/1; MG/1
1 TABLET ORAL EVERY 4 HOURS PRN
Qty: 30 TABLET | Refills: 0 | Status: SHIPPED | OUTPATIENT
Start: 2018-11-15 | End: 2018-11-25

## 2018-11-15 RX ORDER — MAGNESIUM HYDROXIDE 1200 MG/15ML
LIQUID ORAL AS NEEDED
Status: DISCONTINUED | OUTPATIENT
Start: 2018-11-15 | End: 2018-11-15 | Stop reason: HOSPADM

## 2018-11-15 RX ORDER — MIDAZOLAM HYDROCHLORIDE 1 MG/ML
INJECTION INTRAMUSCULAR; INTRAVENOUS AS NEEDED
Status: DISCONTINUED | OUTPATIENT
Start: 2018-11-15 | End: 2018-11-15 | Stop reason: SURG

## 2018-11-15 RX ORDER — HYDROCODONE BITARTRATE AND ACETAMINOPHEN 5; 325 MG/1; MG/1
1 TABLET ORAL EVERY 4 HOURS PRN
Status: DISCONTINUED | OUTPATIENT
Start: 2018-11-15 | End: 2018-11-15 | Stop reason: HOSPADM

## 2018-11-15 RX ORDER — MIDAZOLAM HYDROCHLORIDE 1 MG/ML
1 INJECTION INTRAMUSCULAR; INTRAVENOUS
Status: DISCONTINUED | OUTPATIENT
Start: 2018-11-15 | End: 2018-11-15 | Stop reason: HOSPADM

## 2018-11-15 RX ORDER — FAMOTIDINE 10 MG/ML
INJECTION, SOLUTION INTRAVENOUS AS NEEDED
Status: DISCONTINUED | OUTPATIENT
Start: 2018-11-15 | End: 2018-11-15 | Stop reason: SURG

## 2018-11-15 RX ORDER — GLYCOPYRROLATE 0.2 MG/ML
INJECTION INTRAMUSCULAR; INTRAVENOUS AS NEEDED
Status: DISCONTINUED | OUTPATIENT
Start: 2018-11-15 | End: 2018-11-15 | Stop reason: SURG

## 2018-11-15 RX ORDER — PROPOFOL 10 MG/ML
VIAL (ML) INTRAVENOUS AS NEEDED
Status: DISCONTINUED | OUTPATIENT
Start: 2018-11-15 | End: 2018-11-15 | Stop reason: SURG

## 2018-11-15 RX ORDER — MIDAZOLAM HYDROCHLORIDE 1 MG/ML
2 INJECTION INTRAMUSCULAR; INTRAVENOUS
Status: DISCONTINUED | OUTPATIENT
Start: 2018-11-15 | End: 2018-11-15 | Stop reason: HOSPADM

## 2018-11-15 RX ORDER — SODIUM CHLORIDE 0.9 % (FLUSH) 0.9 %
3 SYRINGE (ML) INJECTION EVERY 12 HOURS SCHEDULED
Status: DISCONTINUED | OUTPATIENT
Start: 2018-11-15 | End: 2018-11-15 | Stop reason: HOSPADM

## 2018-11-15 RX ORDER — NEOSTIGMINE METHYLSULFATE 1 MG/ML
INJECTION, SOLUTION INTRAVENOUS AS NEEDED
Status: DISCONTINUED | OUTPATIENT
Start: 2018-11-15 | End: 2018-11-15 | Stop reason: SURG

## 2018-11-15 RX ORDER — SODIUM CHLORIDE 0.9 % (FLUSH) 0.9 %
3-10 SYRINGE (ML) INJECTION AS NEEDED
Status: DISCONTINUED | OUTPATIENT
Start: 2018-11-15 | End: 2018-11-15 | Stop reason: HOSPADM

## 2018-11-15 RX ORDER — LIDOCAINE HYDROCHLORIDE 20 MG/ML
INJECTION, SOLUTION INFILTRATION; PERINEURAL AS NEEDED
Status: DISCONTINUED | OUTPATIENT
Start: 2018-11-15 | End: 2018-11-15 | Stop reason: SURG

## 2018-11-15 RX ORDER — FENTANYL CITRATE 50 UG/ML
50 INJECTION, SOLUTION INTRAMUSCULAR; INTRAVENOUS
Status: DISCONTINUED | OUTPATIENT
Start: 2018-11-15 | End: 2018-11-15 | Stop reason: HOSPADM

## 2018-11-15 RX ORDER — BUPIVACAINE HYDROCHLORIDE AND EPINEPHRINE 5; 5 MG/ML; UG/ML
INJECTION, SOLUTION PERINEURAL AS NEEDED
Status: DISCONTINUED | OUTPATIENT
Start: 2018-11-15 | End: 2018-11-15 | Stop reason: HOSPADM

## 2018-11-15 RX ORDER — SODIUM CHLORIDE, SODIUM LACTATE, POTASSIUM CHLORIDE, CALCIUM CHLORIDE 600; 310; 30; 20 MG/100ML; MG/100ML; MG/100ML; MG/100ML
125 INJECTION, SOLUTION INTRAVENOUS CONTINUOUS
Status: DISCONTINUED | OUTPATIENT
Start: 2018-11-15 | End: 2018-11-15 | Stop reason: HOSPADM

## 2018-11-15 RX ORDER — ONDANSETRON 2 MG/ML
4 INJECTION INTRAMUSCULAR; INTRAVENOUS ONCE AS NEEDED
Status: DISCONTINUED | OUTPATIENT
Start: 2018-11-15 | End: 2018-11-15 | Stop reason: HOSPADM

## 2018-11-15 RX ORDER — MEPERIDINE HYDROCHLORIDE 25 MG/ML
12.5 INJECTION INTRAMUSCULAR; INTRAVENOUS; SUBCUTANEOUS
Status: DISCONTINUED | OUTPATIENT
Start: 2018-11-15 | End: 2018-11-15 | Stop reason: HOSPADM

## 2018-11-15 RX ORDER — SODIUM CHLORIDE 9 MG/ML
INJECTION, SOLUTION INTRAVENOUS AS NEEDED
Status: DISCONTINUED | OUTPATIENT
Start: 2018-11-15 | End: 2018-11-15 | Stop reason: HOSPADM

## 2018-11-15 RX ORDER — RANITIDINE 150 MG/1
150 TABLET ORAL 2 TIMES DAILY
COMMUNITY
End: 2019-09-06

## 2018-11-15 RX ADMIN — FENTANYL CITRATE 50 MCG: 50 INJECTION INTRAMUSCULAR; INTRAVENOUS at 08:16

## 2018-11-15 RX ADMIN — FENTANYL CITRATE 100 MCG: 50 INJECTION INTRAMUSCULAR; INTRAVENOUS at 08:02

## 2018-11-15 RX ADMIN — FENTANYL CITRATE 50 MCG: 50 INJECTION INTRAMUSCULAR; INTRAVENOUS at 08:12

## 2018-11-15 RX ADMIN — FAMOTIDINE 20 MG: 10 INJECTION, SOLUTION INTRAVENOUS at 08:02

## 2018-11-15 RX ADMIN — FENTANYL CITRATE 50 MCG: 50 INJECTION INTRAMUSCULAR; INTRAVENOUS at 08:06

## 2018-11-15 RX ADMIN — NEOSTIGMINE METHYLSULFATE 2 MG: 1 INJECTION, SOLUTION INTRAVENOUS at 08:19

## 2018-11-15 RX ADMIN — FENTANYL CITRATE 50 MCG: 50 INJECTION INTRAMUSCULAR; INTRAVENOUS at 08:21

## 2018-11-15 RX ADMIN — GLYCOPYRROLATE 0.4 MG: 0.2 INJECTION, SOLUTION INTRAMUSCULAR; INTRAVENOUS at 08:19

## 2018-11-15 RX ADMIN — MIDAZOLAM HYDROCHLORIDE 2 MG: 1 INJECTION, SOLUTION INTRAMUSCULAR; INTRAVENOUS at 07:55

## 2018-11-15 RX ADMIN — LIDOCAINE HYDROCHLORIDE 40 MG: 20 INJECTION, SOLUTION INFILTRATION; PERINEURAL at 08:02

## 2018-11-15 RX ADMIN — SODIUM CHLORIDE, POTASSIUM CHLORIDE, SODIUM LACTATE AND CALCIUM CHLORIDE 125 ML/HR: 600; 310; 30; 20 INJECTION, SOLUTION INTRAVENOUS at 07:41

## 2018-11-15 RX ADMIN — PROPOFOL 150 MG: 10 INJECTION, EMULSION INTRAVENOUS at 08:02

## 2018-11-15 NOTE — ANESTHESIA PREPROCEDURE EVALUATION
Anesthesia Evaluation     no history of anesthetic complications:  NPO Solid Status: > 8 hours  NPO Liquid Status: > 8 hours           Airway   Mallampati: II  TM distance: >3 FB  Neck ROM: full  No difficulty expected  Dental    (+) poor dentition    Pulmonary - normal exam   (+) a smoker Current Smoked day of surgery, COPD,   Cardiovascular - normal exam        Neuro/Psych  GI/Hepatic/Renal/Endo    (+)  GERD,      Musculoskeletal     Abdominal  - normal exam   Substance History      OB/GYN          Other                        Anesthesia Plan    ASA 2     general     intravenous induction   Anesthetic plan, all risks, benefits, and alternatives have been provided, discussed and informed consent has been obtained with: patient.

## 2018-11-15 NOTE — ANESTHESIA POSTPROCEDURE EVALUATION
Patient: Bibi Burnett    Procedure Summary     Date:  11/15/18 Room / Location:  [facility] OR  /  [facility] OR    Anesthesia Start:  0755 Anesthesia Stop:  0834    Procedures:       ESOPHAGOGASTRODUODENOSCOPY (N/A Esophagus)      CHOLECYSTECTOMY LAPAROSCOPIC (N/A Abdomen) Diagnosis:       Epigastric pain      Bilious vomiting with nausea      (Epigastric pain [R10.13])      (Bilious vomiting with nausea [R11.14])    Surgeon:  Alejandro Shannon MD Provider:  Ahsan Longoria MD    Anesthesia Type:  general ASA Status:  2          Anesthesia Type: general  Last vitals  BP   94/62 (11/15/18 0851)   Temp   97 °F (36.1 °C) (11/15/18 0836)   Pulse   74 (11/15/18 0851)   Resp   12 (11/15/18 0851)     SpO2   100 % (11/15/18 0851)     Post Anesthesia Care and Evaluation    Patient location during evaluation: bedside  Patient participation: complete - patient participated  Level of consciousness: awake and alert  Pain score: 1  Pain management: adequate  Airway patency: patent  Anesthetic complications: No anesthetic complications  PONV Status: none  Cardiovascular status: acceptable  Respiratory status: acceptable  Hydration status: acceptable

## 2018-11-15 NOTE — ANESTHESIA PROCEDURE NOTES
Airway  Urgency: elective      General Information and Staff    Patient location during procedure: OR  CRNA: iKm Ramirez CRNA    Indications and Patient Condition  Indications for airway management: airway protection    Preoxygenated: yes  Mask difficulty assessment: 1 - vent by mask    Final Airway Details  Final airway type: endotracheal airway      Successful airway: ETT  Cuffed: yes   Successful intubation technique: direct laryngoscopy  Endotracheal tube insertion site: oral  Blade: Dasha  Blade size: 3  ETT size (mm): 7.0  Cormack-Lehane Classification: grade I - full view of glottis  Placement verified by: chest auscultation   Measured from: lips  ETT to lips (cm): 20  Number of attempts at approach: 1

## 2018-11-15 NOTE — OP NOTE
ESOPHAGOGASTRODUODENOSCOPY, CHOLECYSTECTOMY LAPAROSCOPIC  Procedure Note    Bibi Burnett  11/15/2018    Pre-op Diagnosis:   Epigastric pain [R10.13]  Bilious vomiting with nausea [R11.14]    Post-op Diagnosis: gallbladder dysfunction and gastritis         Procedure(s):  ESOPHAGOGASTRODUODENOSCOPY  CHOLECYSTECTOMY LAPAROSCOPIC    Surgeon(s):  Alejandro Shannon MD    Anesthesia: Choice    Staff:   Circulator: Albina Morrison RN  Scrub Person: Eliseo Zimmerman  Assistant: Nav Underwood CSA  Other: Nereida Brooks    Estimated Blood Loss: minimal    Specimens:                  Order Name Source Comment Collection Info Order Time   UREASE FOR H PYLORI Stomach  Collected By: Alejandro Shannon MD 11/15/2018  8:28 AM   SURGICAL PATHOLOGY EXAM Gallbladder  Collected By: Alejandro Shannon MD 11/15/2018  8:18 AM    Collection Date  11/15/2018       Collection Time   8:18 AM            Drains:   NG/OG Tube Orogastric 14 Fr Center mouth (Active)       [REMOVED] NG/OG Tube (Removed)       Procedure:  Two 5 mm and one 11 mm port placed. The cystic duct and artery were dissected out, clipped and divided. The gallbladder was dissected off the liver with cautery and removed from the upper midline port. The gas was allowed to escape and port sites closed with vicryl. Local was injected and dressings applied. The EGD scope went easily down into the duodenum. The duodenum was normal. There was minimal gastritis and a urease biopsy was done. The esophagus was unremarkable.     Findings: gastritis    Complications: none   Grafts / Implants N/A    Alejandro Shannon MD     Date: 11/15/2018  Time: 8:29 AM

## 2018-11-16 ENCOUNTER — TELEPHONE (OUTPATIENT)
Dept: SURGERY | Facility: CLINIC | Age: 38
End: 2018-11-16

## 2018-11-16 LAB — UREASE TISS QL: POSITIVE

## 2018-11-16 RX ORDER — PROMETHAZINE HYDROCHLORIDE 12.5 MG/1
12.5 TABLET ORAL EVERY 6 HOURS PRN
Qty: 30 TABLET | Refills: 0 | Status: SHIPPED | OUTPATIENT
Start: 2018-11-16

## 2018-11-16 RX ORDER — METRONIDAZOLE 500 MG/1
500 TABLET ORAL 2 TIMES DAILY
Qty: 20 TABLET | Refills: 0 | Status: SHIPPED | OUTPATIENT
Start: 2018-11-16 | End: 2018-11-26

## 2018-11-16 RX ORDER — DOXYCYCLINE HYCLATE 100 MG/1
100 TABLET, DELAYED RELEASE ORAL 2 TIMES DAILY
Qty: 20 TABLET | Refills: 0 | Status: SHIPPED | OUTPATIENT
Start: 2018-11-16 | End: 2018-11-26

## 2018-11-16 RX ORDER — LANSOPRAZOLE 30 MG/1
30 CAPSULE, DELAYED RELEASE ORAL DAILY
Qty: 30 CAPSULE | Refills: 1 | OUTPATIENT
Start: 2018-11-16 | End: 2019-09-06

## 2018-11-16 RX ORDER — BISMUTH SUBSALICYLATE 262 MG/1
524 TABLET, CHEWABLE ORAL
Qty: 80 TABLET | Refills: 0 | Status: SHIPPED | OUTPATIENT
Start: 2018-11-16 | End: 2018-11-26

## 2018-11-16 NOTE — TELEPHONE ENCOUNTER
Called patient's mom and told her that she tested for H.Pylori and we sent her meds to the pharmacy. Before I did I checked with the apothecary here about her allergies to ethromycin and they stated the ones we send were not in the same family and would not cause a reaction.

## 2018-11-16 NOTE — TELEPHONE ENCOUNTER
Patient's mom called and said Bibi had been vomiting a lot and was queasy.I told her that we could call her in some Zofran, and she stated that Bibi was allergic to Zofran, that it breaks her out in a rash. She stated that Bibi could tolerate Phenergan and had been prescribed this before. It was listed in her historical medications, so since the patient previously tolerated it I sent her 30 pills in to be taken every 6 hrs PRN.

## 2018-11-19 LAB
LAB AP CASE REPORT: NORMAL
PATH REPORT.FINAL DX SPEC: NORMAL

## 2018-11-26 ENCOUNTER — OFFICE VISIT (OUTPATIENT)
Dept: SURGERY | Facility: CLINIC | Age: 38
End: 2018-11-26

## 2018-11-26 VITALS — HEIGHT: 64 IN | WEIGHT: 134 LBS | BODY MASS INDEX: 22.88 KG/M2

## 2018-11-26 DIAGNOSIS — Z90.49 STATUS POST LAPAROSCOPIC CHOLECYSTECTOMY: Primary | ICD-10-CM

## 2018-11-26 PROBLEM — R10.13 EPIGASTRIC PAIN: Status: RESOLVED | Noted: 2018-11-06 | Resolved: 2018-11-26

## 2018-11-26 PROCEDURE — 99024 POSTOP FOLLOW-UP VISIT: CPT | Performed by: SURGERY

## 2018-11-26 NOTE — PROGRESS NOTES
Subjective   iBbi Burnett is a 38 y.o. female.     History of Present Illness Her urease was positive and she took some of the meds. She could not finish the flagyl due to nausea. Overall she feels much better.     The following portions of the patient's history were reviewed and updated as appropriate: current medications, past family history, past medical history, past social history, past surgical history and problem list.    Review of Systems H pylori    Objective   Physical Exam wounds ok    Assessment/Plan   Bibi was seen today for post-op follow-up.    Diagnoses and all orders for this visit:    Status post laparoscopic cholecystectomy    return prn

## 2019-02-26 ENCOUNTER — OFFICE VISIT (OUTPATIENT)
Dept: SURGERY | Facility: CLINIC | Age: 39
End: 2019-02-26

## 2019-02-26 VITALS — WEIGHT: 134 LBS | HEIGHT: 64 IN | BODY MASS INDEX: 22.88 KG/M2

## 2019-02-26 DIAGNOSIS — R10.13 EPIGASTRIC PAIN: ICD-10-CM

## 2019-02-26 DIAGNOSIS — Z90.49 STATUS POST LAPAROSCOPIC CHOLECYSTECTOMY: Primary | ICD-10-CM

## 2019-02-26 PROCEDURE — 99213 OFFICE O/P EST LOW 20 MIN: CPT | Performed by: SURGERY

## 2019-02-26 NOTE — PROGRESS NOTES
Subjective   Bibi Burnett is a 38 y.o. female.     History of Present Illness She has had epigastric pain after eating near her upper port site from her lap agustin. It is getting worse and does not matter what she eats. She does take chronic narcotics. She has loose bowel movements right after eating.     The following portions of the patient's history were reviewed and updated as appropriate: current medications, past family history, past medical history, past social history, past surgical history and problem list.    Review of Systems   Constitutional: Negative for activity change, appetite change, chills, fever and unexpected weight change.   HENT: Negative for congestion, facial swelling and sore throat.    Eyes: Negative for photophobia and visual disturbance.   Respiratory: Negative for chest tightness, shortness of breath and wheezing.    Cardiovascular: Negative for chest pain, palpitations and leg swelling.   Gastrointestinal: Positive for abdominal pain and nausea. Negative for abdominal distention, anal bleeding, blood in stool, constipation, diarrhea, rectal pain and vomiting.   Endocrine: Negative for cold intolerance, heat intolerance, polydipsia and polyuria.   Genitourinary: Negative for difficulty urinating, dysuria, flank pain and urgency.   Musculoskeletal: Negative for back pain and myalgias.   Skin: Negative for rash and wound.   Allergic/Immunologic: Negative for immunocompromised state.   Neurological: Negative for dizziness, seizures, syncope, light-headedness, numbness and headaches.   Hematological: Negative for adenopathy. Does not bruise/bleed easily.   Psychiatric/Behavioral: Negative for behavioral problems and confusion. The patient is not nervous/anxious.        Objective   Physical Exam   Constitutional: She is oriented to person, place, and time. She appears well-developed and well-nourished. She does not appear ill. No distress.   HENT:   Head: Normocephalic. Head is without  laceration. Hair is normal.   Right Ear: Hearing and ear canal normal.   Left Ear: Hearing and ear canal normal.   Nose: Nose normal. No sinus tenderness. No epistaxis. Right sinus exhibits no maxillary sinus tenderness and no frontal sinus tenderness. Left sinus exhibits no maxillary sinus tenderness and no frontal sinus tenderness.   Eyes: Conjunctivae and lids are normal. Pupils are equal, round, and reactive to light.   Neck: Normal range of motion. No JVD present. No tracheal tenderness present. No tracheal deviation present. No thyroid mass and no thyromegaly present.   Cardiovascular: Normal rate and regular rhythm. Exam reveals no gallop.   No murmur heard.  Pulmonary/Chest: Effort normal and breath sounds normal. No stridor. She has no wheezes. She exhibits no tenderness.   Abdominal: Soft. Bowel sounds are normal. She exhibits no distension, no ascites and no mass. There is tenderness. There is no rebound and no guarding. No hernia.   Musculoskeletal: She exhibits no edema or deformity.   Lymphadenopathy:     She has no cervical adenopathy.     She has no axillary adenopathy.        Right: No inguinal and no supraclavicular adenopathy present.        Left: No inguinal and no supraclavicular adenopathy present.   Neurological: She is alert and oriented to person, place, and time. She exhibits normal muscle tone.   Skin: Skin is warm, dry and intact. No rash noted. No erythema. No pallor.   Psychiatric: She has a normal mood and affect. Her behavior is normal. Thought content normal.   Vitals reviewed.      Assessment/Plan   Bibi was seen today for abdominal pain.    Diagnoses and all orders for this visit:    Status post laparoscopic cholecystectomy    Epigastric pain      I advised Bibi of the risks of continuing to use tobacco, and I provided her with tobacco cessation educational materials in the After Visit Summary.     During this visit, I spent 1 minutes counseling the patient regarding tobacco  cessation.    Will do EGD

## 2019-09-06 ENCOUNTER — HOSPITAL ENCOUNTER (EMERGENCY)
Facility: HOSPITAL | Age: 39
Discharge: HOME | End: 2019-09-06
Attending: EMERGENCY MEDICINE | Admitting: EMERGENCY MEDICINE

## 2019-09-06 ENCOUNTER — APPOINTMENT (OUTPATIENT)
Dept: GENERAL RADIOLOGY | Facility: HOSPITAL | Age: 39
End: 2019-09-06

## 2019-09-06 VITALS
SYSTOLIC BLOOD PRESSURE: 121 MMHG | DIASTOLIC BLOOD PRESSURE: 59 MMHG | BODY MASS INDEX: 21.86 KG/M2 | HEART RATE: 75 BPM | RESPIRATION RATE: 18 BRPM | TEMPERATURE: 98.6 F | HEIGHT: 66 IN | WEIGHT: 136 LBS | OXYGEN SATURATION: 98 %

## 2019-09-06 DIAGNOSIS — M10.9 ACUTE GOUT OF RIGHT ELBOW, UNSPECIFIED CAUSE: Primary | ICD-10-CM

## 2019-09-06 LAB
ALBUMIN SERPL-MCNC: 4.13 G/DL (ref 3.5–5.2)
ALBUMIN/GLOB SERPL: 1.3 G/DL
ALP SERPL-CCNC: 92 U/L (ref 39–117)
ALT SERPL W P-5'-P-CCNC: 12 U/L (ref 1–33)
ANION GAP SERPL CALCULATED.3IONS-SCNC: 11.1 MMOL/L (ref 5–15)
AST SERPL-CCNC: 10 U/L (ref 1–32)
BASOPHILS # BLD AUTO: 0.01 10*3/MM3 (ref 0–0.2)
BASOPHILS NFR BLD AUTO: 0.2 % (ref 0–1.5)
BILIRUB SERPL-MCNC: 0.3 MG/DL (ref 0.2–1.2)
BUN BLD-MCNC: 5 MG/DL (ref 6–20)
BUN/CREAT SERPL: 7.6 (ref 7–25)
CALCIUM SPEC-SCNC: 9.1 MG/DL (ref 8.6–10.5)
CHLORIDE SERPL-SCNC: 104 MMOL/L (ref 98–107)
CO2 SERPL-SCNC: 24.9 MMOL/L (ref 22–29)
CREAT BLD-MCNC: 0.66 MG/DL (ref 0.57–1)
CRP SERPL-MCNC: 0.28 MG/DL (ref 0–0.5)
DEPRECATED RDW RBC AUTO: 45.3 FL (ref 37–54)
EOSINOPHIL # BLD AUTO: 0.07 10*3/MM3 (ref 0–0.4)
EOSINOPHIL NFR BLD AUTO: 1.1 % (ref 0.3–6.2)
ERYTHROCYTE [DISTWIDTH] IN BLOOD BY AUTOMATED COUNT: 14 % (ref 12.3–15.4)
GFR SERPL CREATININE-BSD FRML MDRD: 100 ML/MIN/1.73
GLOBULIN UR ELPH-MCNC: 3.1 GM/DL
GLUCOSE BLD-MCNC: 99 MG/DL (ref 65–99)
HCT VFR BLD AUTO: 40.8 % (ref 34–46.6)
HGB BLD-MCNC: 13.3 G/DL (ref 12–15.9)
IMM GRANULOCYTES # BLD AUTO: 0.01 10*3/MM3 (ref 0–0.05)
IMM GRANULOCYTES NFR BLD AUTO: 0.2 % (ref 0–0.5)
LYMPHOCYTES # BLD AUTO: 2.05 10*3/MM3 (ref 0.7–3.1)
LYMPHOCYTES NFR BLD AUTO: 31.3 % (ref 19.6–45.3)
MCH RBC QN AUTO: 29 PG (ref 26.6–33)
MCHC RBC AUTO-ENTMCNC: 32.6 G/DL (ref 31.5–35.7)
MCV RBC AUTO: 88.9 FL (ref 79–97)
MONOCYTES # BLD AUTO: 0.48 10*3/MM3 (ref 0.1–0.9)
MONOCYTES NFR BLD AUTO: 7.3 % (ref 5–12)
NEUTROPHILS # BLD AUTO: 3.93 10*3/MM3 (ref 1.7–7)
NEUTROPHILS NFR BLD AUTO: 59.9 % (ref 42.7–76)
PLATELET # BLD AUTO: 272 10*3/MM3 (ref 140–450)
PMV BLD AUTO: 10.3 FL (ref 6–12)
POTASSIUM BLD-SCNC: 4 MMOL/L (ref 3.5–5.2)
PROT SERPL-MCNC: 7.2 G/DL (ref 6–8.5)
RBC # BLD AUTO: 4.59 10*6/MM3 (ref 3.77–5.28)
SODIUM BLD-SCNC: 140 MMOL/L (ref 136–145)
URATE SERPL-MCNC: 6.5 MG/DL (ref 2.4–5.7)
WBC NRBC COR # BLD: 6.55 10*3/MM3 (ref 3.4–10.8)

## 2019-09-06 PROCEDURE — 86140 C-REACTIVE PROTEIN: CPT | Performed by: PHYSICIAN ASSISTANT

## 2019-09-06 PROCEDURE — 85025 COMPLETE CBC W/AUTO DIFF WBC: CPT | Performed by: PHYSICIAN ASSISTANT

## 2019-09-06 PROCEDURE — 80053 COMPREHEN METABOLIC PANEL: CPT | Performed by: PHYSICIAN ASSISTANT

## 2019-09-06 PROCEDURE — 96372 THER/PROPH/DIAG INJ SC/IM: CPT

## 2019-09-06 PROCEDURE — 84550 ASSAY OF BLOOD/URIC ACID: CPT | Performed by: PHYSICIAN ASSISTANT

## 2019-09-06 PROCEDURE — 99283 EMERGENCY DEPT VISIT LOW MDM: CPT

## 2019-09-06 PROCEDURE — 73080 X-RAY EXAM OF ELBOW: CPT | Performed by: RADIOLOGY

## 2019-09-06 PROCEDURE — 73080 X-RAY EXAM OF ELBOW: CPT

## 2019-09-06 PROCEDURE — 25010000002 METHYLPREDNISOLONE PER 125 MG: Performed by: PHYSICIAN ASSISTANT

## 2019-09-06 RX ORDER — COLCHICINE 0.6 MG/1
TABLET ORAL
Qty: 3 TABLET | Refills: 0 | Status: SHIPPED | OUTPATIENT
Start: 2019-09-06

## 2019-09-06 RX ORDER — METHYLPREDNISOLONE 4 MG/1
TABLET ORAL
Qty: 21 TABLET | Refills: 0 | Status: SHIPPED | OUTPATIENT
Start: 2019-09-06 | End: 2022-09-09

## 2019-09-06 RX ORDER — METHYLPREDNISOLONE SODIUM SUCCINATE 125 MG/2ML
80 INJECTION, POWDER, LYOPHILIZED, FOR SOLUTION INTRAMUSCULAR; INTRAVENOUS ONCE
Status: COMPLETED | OUTPATIENT
Start: 2019-09-06 | End: 2019-09-06

## 2019-09-06 RX ADMIN — METHYLPREDNISOLONE SODIUM SUCCINATE 80 MG: 125 INJECTION, POWDER, FOR SOLUTION INTRAMUSCULAR; INTRAVENOUS at 11:05

## 2019-09-06 NOTE — ED PROVIDER NOTES
Subjective   39-year-old white female presents secondary to 1 week history of right elbow pain.  No fall or injury.  She states this does hurt with movement.  This has been swollen.  She has a history of gout in the past.  She feels that this could be flared.  She voices no other complaints at this time.  No chest pain pressure tightness or squeezing.            Review of Systems   Constitutional: Negative.  Negative for fever.   HENT: Negative.    Respiratory: Negative.    Cardiovascular: Negative.  Negative for chest pain.   Gastrointestinal: Negative.  Negative for abdominal pain.   Endocrine: Negative.    Genitourinary: Negative.  Negative for dysuria.   Skin: Negative.    Neurological: Negative.    Psychiatric/Behavioral: Negative.    All other systems reviewed and are negative.      Past Medical History:   Diagnosis Date   • COPD (chronic obstructive pulmonary disease) (CMS/HCC)    • GERD (gastroesophageal reflux disease)    • Hx: UTI (urinary tract infection)    • Injury of back    • Migraine    • Scoliosis        Allergies   Allergen Reactions   • Anaprox [Naproxen Sodium]    • Carafate [Sucralfate] GI Intolerance   • Codeine    • Combivent [Ipratropium-Albuterol]    • Compazine [Prochlorperazine Edisylate]    • Dilantin [Phenytoin Sodium Extended]    • Divalproex Sodium    • Erythromycin    • Fluticasone    • Imitrex [Sumatriptan]    • Keflex [Cephalexin]    • Morphine And Related    • Naproxen    • Penicillins    • Protonix [Pantoprazole Sodium]    • Robaxin [Methocarbamol] Hives   • Tetracyclines & Related    • Topamax [Topiramate]        Past Surgical History:   Procedure Laterality Date   • CHOLECYSTECTOMY N/A 11/15/2018    Procedure: CHOLECYSTECTOMY LAPAROSCOPIC;  Surgeon: Alejandro Shannon MD;  Location: [facility] OR;  Service: General   • ENDOSCOPY N/A 11/15/2018    Procedure: ESOPHAGOGASTRODUODENOSCOPY;  Surgeon: Alejandro Shannon MD;  Location: [facility] OR;  Service: General       History reviewed. No  pertinent family history.    Social History     Socioeconomic History   • Marital status: Single     Spouse name: Not on file   • Number of children: Not on file   • Years of education: Not on file   • Highest education level: Not on file   Tobacco Use   • Smoking status: Current Every Day Smoker     Packs/day: 0.50     Years: 10.00     Pack years: 5.00     Types: Cigarettes   • Smokeless tobacco: Never Used   Substance and Sexual Activity   • Alcohol use: No   • Drug use: No   • Sexual activity: Defer           Objective   Physical Exam   Constitutional: She is oriented to person, place, and time. She appears well-developed and well-nourished. No distress.   HENT:   Head: Normocephalic and atraumatic.   Right Ear: External ear normal.   Left Ear: External ear normal.   Nose: Nose normal.   Eyes: Conjunctivae and EOM are normal. Pupils are equal, round, and reactive to light.   Neck: Normal range of motion. Neck supple. No JVD present. No tracheal deviation present.   Cardiovascular: Normal rate, regular rhythm and normal heart sounds.   No murmur heard.  Pulmonary/Chest: Effort normal and breath sounds normal. No respiratory distress. She has no wheezes.   Abdominal: Soft. Bowel sounds are normal. There is no tenderness.   Musculoskeletal: Normal range of motion. She exhibits tenderness (over right elbow. No ashkan redness or swelling.). She exhibits no edema or deformity.   Neurological: She is alert and oriented to person, place, and time. No cranial nerve deficit.   Skin: Skin is warm and dry. No rash noted. She is not diaphoretic. No erythema. No pallor.   Psychiatric: She has a normal mood and affect. Her behavior is normal. Thought content normal.   Nursing note and vitals reviewed.      Procedures       XR Elbow 3+ View Right   ED Interpretation   Negative per radiologist      Final Result   No acute bony abnormality.            This report was finalized on 9/6/2019 10:25 AM by Dr. Eliseo Kruse MD.               ED Course                  MDM  Number of Diagnoses or Management Options  Acute gout of right elbow, unspecified cause: new and requires workup     Amount and/or Complexity of Data Reviewed  Clinical lab tests: reviewed and ordered  Tests in the radiology section of CPT®: reviewed and ordered  Discuss the patient with other providers: yes  Independent visualization of images, tracings, or specimens: yes    Risk of Complications, Morbidity, and/or Mortality  Presenting problems: low        Final diagnoses:   Acute gout of right elbow, unspecified cause              Kam King PA  09/06/19 1316

## 2020-08-25 ENCOUNTER — TRANSCRIBE ORDERS (OUTPATIENT)
Dept: ADMINISTRATIVE | Facility: HOSPITAL | Age: 40
End: 2020-08-25

## 2020-08-25 DIAGNOSIS — R10.31 RIGHT LOWER QUADRANT PAIN: Primary | ICD-10-CM

## 2020-08-26 ENCOUNTER — HOSPITAL ENCOUNTER (OUTPATIENT)
Dept: CT IMAGING | Facility: HOSPITAL | Age: 40
Discharge: HOME | End: 2020-08-26
Admitting: FAMILY MEDICINE

## 2020-08-26 DIAGNOSIS — R10.31 RIGHT LOWER QUADRANT PAIN: ICD-10-CM

## 2020-08-26 PROCEDURE — 0 IOVERSOL 68 % SOLUTION: Performed by: FAMILY MEDICINE

## 2020-08-26 PROCEDURE — 74177 CT ABD & PELVIS W/CONTRAST: CPT

## 2020-08-26 PROCEDURE — 74177 CT ABD & PELVIS W/CONTRAST: CPT | Performed by: RADIOLOGY

## 2020-08-26 RX ADMIN — IOVERSOL 100 ML: 678 INJECTION INTRA-ARTERIAL; INTRAVENOUS at 11:02

## 2021-03-16 ENCOUNTER — BULK ORDERING (OUTPATIENT)
Dept: CASE MANAGEMENT | Facility: OTHER | Age: 41
End: 2021-03-16

## 2021-03-16 DIAGNOSIS — Z23 IMMUNIZATION DUE: ICD-10-CM

## 2022-09-09 ENCOUNTER — OFFICE VISIT (OUTPATIENT)
Dept: CARDIOLOGY | Facility: CLINIC | Age: 42
End: 2022-09-09

## 2022-09-09 VITALS
OXYGEN SATURATION: 98 % | HEIGHT: 64 IN | DIASTOLIC BLOOD PRESSURE: 82 MMHG | SYSTOLIC BLOOD PRESSURE: 122 MMHG | BODY MASS INDEX: 24.17 KG/M2 | HEART RATE: 97 BPM | WEIGHT: 141.6 LBS

## 2022-09-09 DIAGNOSIS — F17.210 SMOKING GREATER THAN 10 PACK YEARS: ICD-10-CM

## 2022-09-09 DIAGNOSIS — E78.00 PURE HYPERCHOLESTEROLEMIA: ICD-10-CM

## 2022-09-09 DIAGNOSIS — R07.9 CHEST PAIN, UNSPECIFIED TYPE: Primary | ICD-10-CM

## 2022-09-09 DIAGNOSIS — E11.9 TYPE 2 DIABETES MELLITUS WITHOUT COMPLICATION, WITHOUT LONG-TERM CURRENT USE OF INSULIN: ICD-10-CM

## 2022-09-09 PROCEDURE — 93000 ELECTROCARDIOGRAM COMPLETE: CPT | Performed by: INTERNAL MEDICINE

## 2022-09-09 PROCEDURE — 99204 OFFICE O/P NEW MOD 45 MIN: CPT | Performed by: INTERNAL MEDICINE

## 2022-09-09 RX ORDER — ATORVASTATIN CALCIUM 20 MG/1
20 TABLET, FILM COATED ORAL DAILY
Qty: 30 TABLET | Refills: 11 | Status: SHIPPED | OUTPATIENT
Start: 2022-09-09

## 2022-09-09 RX ORDER — PANTOPRAZOLE SODIUM 40 MG/1
TABLET, DELAYED RELEASE ORAL DAILY
COMMUNITY
Start: 2022-09-06

## 2022-09-09 RX ORDER — ERENUMAB-AOOE 70 MG/ML
INJECTION SUBCUTANEOUS
COMMUNITY
Start: 2022-08-15

## 2022-09-09 RX ORDER — CYCLOBENZAPRINE HCL 10 MG
TABLET ORAL 2 TIMES DAILY PRN
COMMUNITY
Start: 2022-08-29

## 2022-09-09 RX ORDER — LORATADINE 10 MG/1
TABLET ORAL DAILY
COMMUNITY
Start: 2022-08-29

## 2022-09-09 RX ORDER — LISINOPRIL 10 MG/1
10 TABLET ORAL DAILY
Qty: 90 TABLET | Refills: 3 | Status: SHIPPED | OUTPATIENT
Start: 2022-09-09 | End: 2022-09-16 | Stop reason: SINTOL

## 2022-09-09 RX ORDER — AMITRIPTYLINE HYDROCHLORIDE 75 MG/1
TABLET, FILM COATED ORAL DAILY
COMMUNITY
Start: 2022-08-29

## 2022-09-09 NOTE — ASSESSMENT & PLAN NOTE
According to her her diabetes is under good control.  Because of her diabetes, smoking, high cholesterol I will start her on a low-dose of ACE inhibitor for renal protection.  I have also advised her to call me if she is starting having any symptoms.

## 2022-09-09 NOTE — ASSESSMENT & PLAN NOTE
She has high cholesterol and was unable to tolerate triglyceride medications.  I will start her on a low-dose of Lipitor.  I have advised her to look for her side effects for joint pains and muscle pains and if she is starting developing any medications problem she was first to call us back.

## 2022-09-09 NOTE — PROGRESS NOTES
OFFICE INITIAL VISIT    Encounter Date:2022       Name: Bibi Burnett        : 1980   Address: [address] [place] [address]    Subjective     Chief Complaint   Patient presents with   • Chest Pain     Consult     History of Present Illness  Bibi Burnett is a 42 y.o. female who is referred here by KIMBERLYN Brennan for evaluation of chest pain that has been going on for about 2 weeks.  It started with a sharp chest pain and make her feel weak and fatigue.  Next day she was not having any chest pain but still feeling fatigue and tired.  She was found to have high blood pressure.  She was told to go visit ER and she refused.  She went to her primary care physician who has done an EKG and referred here for further evaluation.  She is not having any chest pain at this time.  She has multiple risk factors including smoking diabetes hypercholesterolemia and family history for coronary artery disease.  She denies any other associated symptoms at this time.    The following portions of the patient's history were reviewed and updated as appropriate: allergies, current medications, past family history, past medical history, past social history, past surgical history, and problem list.    Patient Active Problem List    Diagnosis Date Noted   • Chest pain 2022   • Type 2 diabetes mellitus without complication, without long-term current use of insulin (HCC) 2022   • Pure hypercholesterolemia 2022   • Smoking greater than 10 pack years 2022   • Epigastric pain 2018   • Status post laparoscopic cholecystectomy 2018       Cardiac Risk Factors: diabetes mellitus, hypercholesterolemia, hypertension, smoking and Positive family Hx. of premature athersclerotivc disease.    Tobacco Use: High Risk   • Smoking Tobacco Use: Current Every Day Smoker   • Smokeless Tobacco Use: Never Used       Allergies   Allergen Reactions   • Anaprox [Naproxen Sodium]    • Carafate [Sucralfate]  GI Intolerance   • Codeine    • Combivent [Ipratropium-Albuterol]    • Compazine [Prochlorperazine Edisylate]    • Compazine [Prochlorperazine] Other (See Comments)     bradycardia   • Dilantin [Phenytoin Sodium Extended]    • Divalproex Sodium    • Erythromycin    • Fluticasone    • Imitrex [Sumatriptan]    • Keflex [Cephalexin]    • Morphine And Related    • Naproxen    • Penicillins    • Protonix [Pantoprazole Sodium]    • Robaxin [Methocarbamol] Hives   • Tetracyclines & Related    • Topamax [Topiramate]        Current Outpatient Medications:   •  Aimovig 70 MG/ML prefilled syringe, Every 30 (Thirty) Days., Disp: , Rfl:   •  amitriptyline (ELAVIL) 75 MG tablet, Daily., Disp: , Rfl:   •  colchicine 0.6 MG tablet, 2 tablets x 1 dose 10.6 mg 1 hour later (Patient taking differently: 0.6 mg Daily. 2 tablets x 1 dose 10.6 mg 1 hour later), Disp: 3 tablet, Rfl: 0  •  cyclobenzaprine (FLEXERIL) 10 MG tablet, 2 (Two) Times a Day As Needed., Disp: , Rfl:   •  HYDROcodone-acetaminophen (NORCO) 7.5-325 MG per tablet, Take 1 tablet by mouth Every 6 (Six) Hours As Needed for Moderate Pain ., Disp: , Rfl:   •  loratadine (CLARITIN) 10 MG tablet, Daily., Disp: , Rfl:   •  pantoprazole (PROTONIX) 40 MG EC tablet, Daily., Disp: , Rfl:   •  ProAir  (90 Base) MCG/ACT inhaler, Daily As Needed., Disp: , Rfl:   •  promethazine (PHENERGAN) 12.5 MG tablet, Take 1 tablet by mouth Every 6 (Six) Hours As Needed for Nausea or Vomiting. (Patient taking differently: Take 25 mg by mouth Every 6 (Six) Hours As Needed for Nausea or Vomiting.), Disp: 30 tablet, Rfl: 0  •  atorvastatin (LIPITOR) 20 MG tablet, Take 1 tablet by mouth Daily., Disp: 30 tablet, Rfl: 11  •  lisinopril (PRINIVIL,ZESTRIL) 10 MG tablet, Take 1 tablet by mouth Daily., Disp: 90 tablet, Rfl: 3    Review of Systems   Cardiovascular: Positive for chest pain and palpitations.   Respiratory: Positive for shortness of breath.    Musculoskeletal: Positive for back pain.  "      Objective     Vitals:    09/09/22 1412   BP: 122/82   BP Location: Right arm   Patient Position: Sitting   Pulse: 97   SpO2: 98%   Weight: 64.2 kg (141 lb 9.6 oz)   Height: 162.6 cm (64\")     Body mass index is 24.31 kg/m².    Vitals reviewed.   Constitutional:       General: Not in acute distress.     Appearance: Healthy appearance. Not in distress.   HENT:      Head: Normocephalic and atraumatic.   Neck:      Vascular: No JVD.      Lymphadenopathy: No cervical adenopathy.   Pulmonary:      Effort: Pulmonary effort is normal.      Breath sounds: Normal breath sounds. No wheezing. No rhonchi. No rales.   Cardiovascular:      Normal rate. Regular rhythm. Normal S1. Normal S2.      Murmurs: There is no murmur.      No gallop. No click. No rub.   Pulses:     Intact distal pulses.   Abdominal:      General: Bowel sounds are normal.      Palpations: Abdomen is soft.      Tenderness: There is no abdominal tenderness.   Musculoskeletal:      Extremities: No clubbing present.Neurological:      General: No focal deficit present.      Mental Status: Alert and oriented to person, place and time.      Gait: Gait is intact.   Psychiatric:         Behavior: Behavior is cooperative.         Lab Review:   Lab Results   Component Value Date    GLUCOSE 99 09/06/2019    BUN 5 (L) 09/06/2019    CREATININE 0.66 09/06/2019    EGFRIFNONA 100 09/06/2019    BCR 7.6 09/06/2019    K 4.0 09/06/2019    CO2 24.9 09/06/2019    CALCIUM 9.1 09/06/2019    ALBUMIN 4.13 09/06/2019    LABIL2 1.7 10/11/2015    AST 10 09/06/2019    ALT 12 09/06/2019     Lab Results   Component Value Date    WBC 6.55 09/06/2019    HGB 13.3 09/06/2019    HCT 40.8 09/06/2019    MCV 88.9 09/06/2019     09/06/2019     No results found for: CHOL, CHLPL, TRIG, HDL, LDL, LDLDIRECT  No results found for: TSH  No results found for: HGBA1C      ECG 12 Lead    Date/Time: 9/9/2022 2:56 PM  Performed by: Julia Wilhelm MD  Authorized by: Julia Wilhelm MD   Rhythm: " sinus rhythm  Rate: normal  QRS axis: normal    Clinical impression: normal ECG            Assessment and Plan     Diagnoses and all orders for this visit:    1. Chest pain, unspecified type (Primary)  Assessment & Plan:  Patient starting having chest pain about 2 weeks ago.  It was sharp in nature.  It was associated with tiredness and fatigue.  She was found to have high blood pressure at that time.  Next day when her blood pressure was fine she was still having some problem.  Because of her multiple risk factors we will go ahead and schedule her for a stress test and echocardiogram.    Orders:  -     lisinopril (PRINIVIL,ZESTRIL) 10 MG tablet; Take 1 tablet by mouth Daily.  Dispense: 90 tablet; Refill: 3  -     atorvastatin (LIPITOR) 20 MG tablet; Take 1 tablet by mouth Daily.  Dispense: 30 tablet; Refill: 11  -     Stress Test With Myocardial Perfusion One Day; Future  -     Adult Transthoracic Echo Complete W/ Cont if Necessary Per Protocol; Future  -     Lipid Panel; Future  -     High Sensitivity CRP; Future  -     Lipoprotein A (LPA); Future  -     MicroAlbumin, Urine, Random - Urine, Clean Catch; Future  -     Hemoglobin A1c; Future  -     TSH; Future    2. Type 2 diabetes mellitus without complication, without long-term current use of insulin (HCC)  Assessment & Plan:  According to her her diabetes is under good control.  Because of her diabetes, smoking, high cholesterol I will start her on a low-dose of ACE inhibitor for renal protection.  I have also advised her to call me if she is starting having any symptoms.    Orders:  -     lisinopril (PRINIVIL,ZESTRIL) 10 MG tablet; Take 1 tablet by mouth Daily.  Dispense: 90 tablet; Refill: 3    3. Pure hypercholesterolemia  Assessment & Plan:  She has high cholesterol and was unable to tolerate triglyceride medications.  I will start her on a low-dose of Lipitor.  I have advised her to look for her side effects for joint pains and muscle pains and if she is  starting developing any medications problem she was first to call us back.    Orders:  -     atorvastatin (LIPITOR) 20 MG tablet; Take 1 tablet by mouth Daily.  Dispense: 30 tablet; Refill: 11    4. Smoking greater than 10 pack years  Assessment & Plan:  Patient has been smoking for last 10 years.  With her multiple risk factors and family history she should stop smoking.  We have discussed in detail the ways and help we can provide if she make up her mind.  She is going to try to work on it and hopefully try to quit smoking.      Other orders  -     ECG 12 Lead    Return in about 3 months (around 12/9/2022).    Julia Wilhelm MD

## 2022-09-09 NOTE — ASSESSMENT & PLAN NOTE
Patient starting having chest pain about 2 weeks ago.  It was sharp in nature.  It was associated with tiredness and fatigue.  She was found to have high blood pressure at that time.  Next day when her blood pressure was fine she was still having some problem.  Because of her multiple risk factors we will go ahead and schedule her for a stress test and echocardiogram.

## 2022-09-09 NOTE — ASSESSMENT & PLAN NOTE
Patient has been smoking for last 10 years.  With her multiple risk factors and family history she should stop smoking.  We have discussed in detail the ways and help we can provide if she make up her mind.  She is going to try to work on it and hopefully try to quit smoking.

## 2022-09-14 ENCOUNTER — TELEPHONE (OUTPATIENT)
Dept: CARDIOLOGY | Facility: CLINIC | Age: 42
End: 2022-09-14

## 2022-09-14 NOTE — TELEPHONE ENCOUNTER
"Patient's mother called to report that the patient feels \"on fire\" since starting Lisinopril ordered at last visit. Would you like to try a different medication?    She currently only takes Lisinopril 10mg daily for hypertension.   "

## 2022-09-16 ENCOUNTER — TELEPHONE (OUTPATIENT)
Dept: CARDIOLOGY | Facility: CLINIC | Age: 42
End: 2022-09-16

## 2022-09-16 RX ORDER — VALSARTAN 80 MG/1
80 TABLET ORAL DAILY
Qty: 90 TABLET | Refills: 0 | Status: SHIPPED | OUTPATIENT
Start: 2022-09-16 | End: 2022-10-20

## 2022-09-16 NOTE — TELEPHONE ENCOUNTER
Pt's mother called to let us know the pharmacy did not receive Valsartan prescription, I checked and it was sent today 9/16 at 9:55 AM. I called Hannah the patient's mother back to let her know this, she verbalized understanding.

## 2022-09-16 NOTE — TELEPHONE ENCOUNTER
"Per MA-\"Please stop lisinopril and wait for symptoms to go away in the next few days.  We will start her on Diovan 80 mg daily.     Patient notified and verbalized understanding.  "

## 2022-09-23 NOTE — TELEPHONE ENCOUNTER
Pt is stating that the Valsartan is making her tired to the point she feels like she cant do anything.     Pt reports her HR is 68-69 since being on Valsartan. The pt has stopped taking the medication for a day and now is back to her normal 78-80. I encouraged the pt to keep taking the medication and record her HR and BP once in the morning 1-2 hours after her medication and once in the evening.  I educated the patient that  HR is still considered normal. Patient verbalized understanding.    Please advise.

## 2022-10-05 ENCOUNTER — TELEPHONE (OUTPATIENT)
Dept: CARDIOLOGY | Facility: CLINIC | Age: 42
End: 2022-10-05

## 2022-10-05 NOTE — TELEPHONE ENCOUNTER
"We recently switched the pt from Lisinopril to Valsartan due to the pt having a \"feeling on fire\" sensation when taking Lisinopril. Below are the new readings since starting Valsartan.    Pt reported BP/ HR  lo/29 Before meds 119/78           After meds       97/60 HR 87     Before meds    95/70           After meds       85/60 HR 65    10/ Before meds    100/75 HR 97           After Meds         90/67 HR 68    10/2 Before meds      85/70 HR 85          After meds         80/60 HR 62    10/3 Before meds      86/77 HR 77          After meds         82/70 HR 61    10/ Before meds      80/74 HR 60          After meds         74/62 HR 59    Pt confirmed current cardiac meds:    Valsartan 80 mg Daily  Liptior 20 mg Daily     ST, Echo scheduled 10/11.     Pt reports her fatigue is continuous , few episodes of chest pain on/off at rest 6/10 sharp in the middle of your chest. Pt denies CP or SOA at this time. I advised the pt that If she is having chest pain to call 911 immediately to get to the closest ER. I also advised the pt to hold her Valsartan if he BP is less than 100 systolic. Patient verbalized understanding.     I educated the pt on 120/80 being a normal blood pressure and HR being , and to let us know if her readings are getting out of these ranges. I educated the pt on signs and symptoms of MI, patient verbalized understanding.     Pt also has no recent BMP or CMP. Faxed PCP for any recent lab results.  Please advise.    "

## 2022-10-05 NOTE — TELEPHONE ENCOUNTER
Pt's mom was calling to report BP/ HR readings. She states after taking Valsartan the pt's BP is getting to 90/60. Pt's mother will be home shortly to have the BP/ HR log. I will call them back shortly to see if I can get a list of these readings.

## 2022-10-11 ENCOUNTER — HOSPITAL ENCOUNTER (OUTPATIENT)
Dept: CARDIOLOGY | Facility: HOSPITAL | Age: 42
Discharge: HOME | End: 2022-10-11

## 2022-10-11 ENCOUNTER — LAB (OUTPATIENT)
Dept: LAB | Facility: HOSPITAL | Age: 42
End: 2022-10-11

## 2022-10-11 DIAGNOSIS — R07.9 CHEST PAIN, UNSPECIFIED TYPE: ICD-10-CM

## 2022-10-11 LAB
ALBUMIN UR-MCNC: <1.2 MG/DL
CHOLEST SERPL-MCNC: 137 MG/DL (ref 0–200)
CRP SERPL-MCNC: 1.05 MG/DL (ref 0.01–0.5)
HBA1C MFR BLD: 5.8 % (ref 4.8–5.6)
HDLC SERPL-MCNC: 32 MG/DL (ref 40–60)
LDLC SERPL CALC-MCNC: 78 MG/DL (ref 0–100)
LDLC/HDLC SERPL: 2.33 {RATIO}
TRIGL SERPL-MCNC: 152 MG/DL (ref 0–150)
TSH SERPL DL<=0.05 MIU/L-ACNC: 1.42 UIU/ML (ref 0.27–4.2)
VLDLC SERPL-MCNC: 27 MG/DL (ref 5–40)

## 2022-10-11 PROCEDURE — 80061 LIPID PANEL: CPT

## 2022-10-11 PROCEDURE — 93017 CV STRESS TEST TRACING ONLY: CPT

## 2022-10-11 PROCEDURE — 0 TECHNETIUM SESTAMIBI: Performed by: INTERNAL MEDICINE

## 2022-10-11 PROCEDURE — 93306 TTE W/DOPPLER COMPLETE: CPT | Performed by: INTERNAL MEDICINE

## 2022-10-11 PROCEDURE — 36415 COLL VENOUS BLD VENIPUNCTURE: CPT

## 2022-10-11 PROCEDURE — 78452 HT MUSCLE IMAGE SPECT MULT: CPT | Performed by: INTERNAL MEDICINE

## 2022-10-11 PROCEDURE — A9500 TC99M SESTAMIBI: HCPCS | Performed by: INTERNAL MEDICINE

## 2022-10-11 PROCEDURE — 93018 CV STRESS TEST I&R ONLY: CPT | Performed by: INTERNAL MEDICINE

## 2022-10-11 PROCEDURE — 86141 C-REACTIVE PROTEIN HS: CPT

## 2022-10-11 PROCEDURE — 78452 HT MUSCLE IMAGE SPECT MULT: CPT

## 2022-10-11 PROCEDURE — 84443 ASSAY THYROID STIM HORMONE: CPT

## 2022-10-11 PROCEDURE — 93306 TTE W/DOPPLER COMPLETE: CPT

## 2022-10-11 PROCEDURE — 83036 HEMOGLOBIN GLYCOSYLATED A1C: CPT

## 2022-10-11 PROCEDURE — 82043 UR ALBUMIN QUANTITATIVE: CPT

## 2022-10-11 PROCEDURE — 83695 ASSAY OF LIPOPROTEIN(A): CPT

## 2022-10-11 RX ADMIN — TECHNETIUM TC 99M SESTAMIBI 1 DOSE: 1 INJECTION INTRAVENOUS at 13:10

## 2022-10-11 RX ADMIN — TECHNETIUM TC 99M SESTAMIBI 1 DOSE: 1 INJECTION INTRAVENOUS at 10:45

## 2022-10-12 ENCOUNTER — TELEPHONE (OUTPATIENT)
Dept: CARDIOLOGY | Facility: CLINIC | Age: 42
End: 2022-10-12

## 2022-10-12 LAB
BH CV REST NUCLEAR ISOTOPE DOSE: 9.9 MCI
BH CV STRESS BP STAGE 1: NORMAL
BH CV STRESS BP STAGE 2: NORMAL
BH CV STRESS DURATION MIN STAGE 1: 3
BH CV STRESS DURATION MIN STAGE 2: 3
BH CV STRESS DURATION MIN STAGE 3: 0
BH CV STRESS DURATION SEC STAGE 1: 0
BH CV STRESS DURATION SEC STAGE 2: 0
BH CV STRESS DURATION SEC STAGE 3: 27
BH CV STRESS GRADE STAGE 1: 10
BH CV STRESS GRADE STAGE 2: 12
BH CV STRESS GRADE STAGE 3: 14
BH CV STRESS HR STAGE 1: 151
BH CV STRESS HR STAGE 2: 164
BH CV STRESS HR STAGE 3: 171
BH CV STRESS METS STAGE 1: 5
BH CV STRESS METS STAGE 2: 7.5
BH CV STRESS METS STAGE 3: 10
BH CV STRESS NUCLEAR ISOTOPE DOSE: 33 MCI
BH CV STRESS O2 STAGE 1: 100
BH CV STRESS O2 STAGE 2: 100
BH CV STRESS O2 STAGE 3: 96
BH CV STRESS PROTOCOL 1: NORMAL
BH CV STRESS RECOVERY BP: NORMAL MMHG
BH CV STRESS RECOVERY HR: 114 BPM
BH CV STRESS RECOVERY O2: 100 %
BH CV STRESS SPEED STAGE 1: 1.7
BH CV STRESS SPEED STAGE 2: 2.5
BH CV STRESS SPEED STAGE 3: 3.4
BH CV STRESS STAGE 1: 1
BH CV STRESS STAGE 2: 2
BH CV STRESS STAGE 3: 3
LPA SERPL-SCNC: 22.6 NMOL/L
LV EF NUC BP: 63 %
MAXIMAL PREDICTED HEART RATE: 179 BPM
PERCENT MAX PREDICTED HR: 96.65 %
STRESS BASELINE BP: NORMAL MMHG
STRESS BASELINE HR: 111 BPM
STRESS O2 SAT REST: 100 %
STRESS PERCENT HR: 114 %
STRESS POST EXERCISE DUR MIN: 6 MIN
STRESS POST EXERCISE DUR SEC: 27 SEC
STRESS POST O2 SAT PEAK: 96 %
STRESS POST PEAK BP: NORMAL MMHG
STRESS POST PEAK HR: 173 BPM
STRESS TARGET HR: 152 BPM

## 2022-10-12 NOTE — TELEPHONE ENCOUNTER
----- Message from Julia Wilhelm MD sent at 10/11/2022  6:35 PM EDT -----  Please let patient know that her hemoglobin A1c is normal so her sugar is very good control.  Her triglycerides are borderline and CRP is elevated.  We have started her on Lipitor and will repeat her labs in about 6 months.  ----- Message -----  From: Lab, Background User  Sent: 10/11/2022   3:04 PM EDT  To: Julia Wilhelm MD

## 2022-10-12 NOTE — TELEPHONE ENCOUNTER
Spoke with pt about results and recommendations per MA, pt verbalized understanding. Pt reports doing her ST and Echo yesterday.     BP/ HR after coming home from testin/88     Pt reports she has not been logging her BP/ HR, I instructed her to do this and to report her readings back to us, pt verbalized understanding.       Should we just recheck CMP, Lipid panel in 6 months?

## 2022-10-13 NOTE — TELEPHONE ENCOUNTER
"LVM notifying pt of MA recommendations. \"We will look into her stress test and echo and make further changes if needed\".  "

## 2022-10-14 LAB
BH CV ECHO MEAS - AO MAX PG: 4.7 MMHG
BH CV ECHO MEAS - AO MEAN PG: 2.9 MMHG
BH CV ECHO MEAS - AO ROOT DIAM: 3 CM
BH CV ECHO MEAS - AO V2 MAX: 108.6 CM/SEC
BH CV ECHO MEAS - AO V2 VTI: 18 CM
BH CV ECHO MEAS - AVA(I,D): 2.5 CM2
BH CV ECHO MEAS - EDV(CUBED): 70.1 ML
BH CV ECHO MEAS - EDV(MOD-SP2): 87.9 ML
BH CV ECHO MEAS - EDV(MOD-SP4): 78 ML
BH CV ECHO MEAS - EF(MOD-BP): 59 %
BH CV ECHO MEAS - EF(MOD-SP2): 61.1 %
BH CV ECHO MEAS - EF(MOD-SP4): 55.9 %
BH CV ECHO MEAS - ESV(CUBED): 23.3 ML
BH CV ECHO MEAS - ESV(MOD-SP2): 34.2 ML
BH CV ECHO MEAS - ESV(MOD-SP4): 34.4 ML
BH CV ECHO MEAS - FS: 30.7 %
BH CV ECHO MEAS - IVS/LVPW: 0.86 CM
BH CV ECHO MEAS - IVSD: 0.76 CM
BH CV ECHO MEAS - LA DIMENSION: 2.38 CM
BH CV ECHO MEAS - LAT PEAK E' VEL: 14 CM/SEC
BH CV ECHO MEAS - LV MASS(C)D: 101.9 GRAMS
BH CV ECHO MEAS - LV MAX PG: 3.1 MMHG
BH CV ECHO MEAS - LV MEAN PG: 1.81 MMHG
BH CV ECHO MEAS - LV V1 MAX: 88.1 CM/SEC
BH CV ECHO MEAS - LV V1 VTI: 14.9 CM
BH CV ECHO MEAS - LVIDD: 4.1 CM
BH CV ECHO MEAS - LVIDS: 2.9 CM
BH CV ECHO MEAS - LVOT AREA: 3.1 CM2
BH CV ECHO MEAS - LVOT DIAM: 1.97 CM
BH CV ECHO MEAS - LVPWD: 0.88 CM
BH CV ECHO MEAS - MED PEAK E' VEL: 7.1 CM/SEC
BH CV ECHO MEAS - MV DEC SLOPE: 393.5 CM/SEC2
BH CV ECHO MEAS - MV E MAX VEL: 80.4 CM/SEC
BH CV ECHO MEAS - MV P1/2T: 60.7 MSEC
BH CV ECHO MEAS - MVA(P1/2T): 3.6 CM2
BH CV ECHO MEAS - PA ACC TIME: 0.18 SEC
BH CV ECHO MEAS - PA PR(ACCEL): -3.4 MMHG
BH CV ECHO MEAS - PA V2 MAX: 99.1 CM/SEC
BH CV ECHO MEAS - SV(LVOT): 45.5 ML
BH CV ECHO MEAS - SV(MOD-SP2): 53.7 ML
BH CV ECHO MEAS - SV(MOD-SP4): 43.6 ML
BH CV ECHO MEAS - TAPSE (>1.6): 1.53 CM
BH CV ECHO MEAS - TR MAX PG: 13 MMHG
BH CV ECHO MEAS - TR MAX VEL: 180.5 CM/SEC
BH CV ECHO MEASUREMENTS AVERAGE E/E' RATIO: 7.62
BH CV VAS BP LEFT ARM: NORMAL MMHG
BH CV XLRA - RV BASE: 2 CM
BH CV XLRA - RV LENGTH: 5.4 CM
BH CV XLRA - RV MID: 1.43 CM
BH CV XLRA - TDI S': 8.5 CM/SEC
LEFT ATRIUM VOLUME INDEX: 15.4 ML/M2
LV EF 2D ECHO EST: 55 %
MAXIMAL PREDICTED HEART RATE: 179 BPM
STRESS TARGET HR: 152 BPM

## 2022-10-17 ENCOUNTER — TELEPHONE (OUTPATIENT)
Dept: CARDIOLOGY | Facility: CLINIC | Age: 42
End: 2022-10-17

## 2022-10-19 ENCOUNTER — TELEPHONE (OUTPATIENT)
Dept: CARDIOLOGY | Facility: CLINIC | Age: 42
End: 2022-10-19

## 2022-10-19 NOTE — TELEPHONE ENCOUNTER
"Notified pt \" ST and Echo are normal \" per MA. Pt verbalized understanding.     Pt reports that when she takes a whole 80 mg Valsartan her BP was 80/60, pt reports she cut the pill in half and it was still around 90/60, pt then states she tried to take a 1/4 tablet of Valsartan and her BP/ Hr readings are below:    10/11 128/88   10/12 128/86   10/13 107/69 HR 93  10/14 124/73 HR 97  10/15 138/88   10/16 111/68 HR 87  10/17 110/68 HR 85    Can we adjust her dose of Valsartan 80 mg so that the pt will not have to keep cutting a tablet in 1/4?    Please advise.     "

## 2022-10-20 RX ORDER — VALSARTAN 80 MG/1
40 TABLET ORAL DAILY
Qty: 90 TABLET | Refills: 0
Start: 2022-10-20 | End: 2022-12-16 | Stop reason: SDUPTHER

## 2022-10-20 NOTE — TELEPHONE ENCOUNTER
"Notified pt \"Can we cut the prescription to 40 mg and she can continue to have.  Unfortunately it is uncommon 20 mg. \" per MA. Pt verbalized understanding.   "

## 2022-12-15 ENCOUNTER — OFFICE VISIT (OUTPATIENT)
Dept: CARDIOLOGY | Facility: CLINIC | Age: 42
End: 2022-12-15

## 2022-12-15 VITALS
WEIGHT: 143 LBS | DIASTOLIC BLOOD PRESSURE: 74 MMHG | OXYGEN SATURATION: 98 % | SYSTOLIC BLOOD PRESSURE: 112 MMHG | HEIGHT: 64 IN | BODY MASS INDEX: 24.41 KG/M2 | HEART RATE: 92 BPM

## 2022-12-15 DIAGNOSIS — E11.9 TYPE 2 DIABETES MELLITUS WITHOUT COMPLICATION, WITHOUT LONG-TERM CURRENT USE OF INSULIN: ICD-10-CM

## 2022-12-15 DIAGNOSIS — I20.8 OTHER FORMS OF ANGINA PECTORIS: Primary | ICD-10-CM

## 2022-12-15 DIAGNOSIS — E78.00 PURE HYPERCHOLESTEROLEMIA: ICD-10-CM

## 2022-12-15 PROCEDURE — 99213 OFFICE O/P EST LOW 20 MIN: CPT | Performed by: INTERNAL MEDICINE

## 2022-12-15 RX ORDER — IBUPROFEN 600 MG/1
TABLET ORAL AS NEEDED
COMMUNITY
Start: 2022-11-28

## 2022-12-15 RX ORDER — VALSARTAN 80 MG/1
20 TABLET ORAL DAILY
Status: CANCELLED
Start: 2022-12-15

## 2022-12-15 NOTE — PROGRESS NOTES
Follow-up Visit      Date: 12/15/2022  Patient Name: Bibi Burnett  : 1980   MRN: [id number]     Chief Complaint:    Chief Complaint   Patient presents with   • Chest pain, unspecified type       History of Present Illness: Bibi Burnett is a 42 y.o. female who is here today for follow-up on her chest pain.  Her stress test has been normal and is mostly musculoskeletal and she has been doing much better.  She denies any other symptoms at this time she is trying to quit smoking.  She is able to do all her work what needs to be done.      Problem List         CARDIAC  Coronary Artery Disease:   Chest pain, 10/12/2022: Normal stress test    Myocardium:   Echo, 10/14/2022: LVEF normal    Valvular:   Normal    Electrical:   Normal    Percardium:   Normal      CARDIAC RISK FACTORS:         Hypertension         Dyslipidemia         Family History         Tobacco Use    NON-CARDIAC:  Migraine  Scoliosis  GERD  COPD  Back injury    SURGERIES:  Cholecystectomy    Subjective      Review of Systems:   Review of Systems   Cardiovascular: Positive for chest pain.       Medications:     Current Outpatient Medications:   •  Aimovig 70 MG/ML prefilled syringe, Every 30 (Thirty) Days., Disp: , Rfl:   •  amitriptyline (ELAVIL) 75 MG tablet, Daily., Disp: , Rfl:   •  atorvastatin (LIPITOR) 20 MG tablet, Take 1 tablet by mouth Daily., Disp: 30 tablet, Rfl: 11  •  colchicine 0.6 MG tablet, 2 tablets x 1 dose 10.6 mg 1 hour later (Patient taking differently: 0.6 mg Daily. 1 tab qd), Disp: 3 tablet, Rfl: 0  •  cyclobenzaprine (FLEXERIL) 10 MG tablet, 2 (Two) Times a Day As Needed., Disp: , Rfl:   •  ibuprofen (ADVIL,MOTRIN) 600 MG tablet, As Needed., Disp: , Rfl:   •  loratadine (CLARITIN) 10 MG tablet, Daily., Disp: , Rfl:   •  pantoprazole (PROTONIX) 40 MG EC tablet, Daily., Disp: , Rfl:   •  ProAir  (90 Base) MCG/ACT inhaler, Daily As Needed., Disp: , Rfl:   •  promethazine (PHENERGAN) 12.5 MG tablet, Take 1  "tablet by mouth Every 6 (Six) Hours As Needed for Nausea or Vomiting. (Patient taking differently: Take 25 mg by mouth Every 6 (Six) Hours As Needed for Nausea or Vomiting.), Disp: 30 tablet, Rfl: 0  •  valsartan (DIOVAN) 80 MG tablet, Take 0.5 tablets by mouth Daily. (Patient taking differently: Take 20 mg by mouth Daily.), Disp: 90 tablet, Rfl: 0    Allergies:   Allergies   Allergen Reactions   • Anaprox [Naproxen Sodium]    • Carafate [Sucralfate] GI Intolerance   • Codeine    • Combivent [Ipratropium-Albuterol]    • Compazine [Prochlorperazine Edisylate]    • Compazine [Prochlorperazine] Other (See Comments)     bradycardia   • Dilantin [Phenytoin Sodium Extended]    • Divalproex Sodium    • Erythromycin    • Fluticasone    • Imitrex [Sumatriptan]    • Keflex [Cephalexin]    • Lisinopril Other (See Comments)     Patient states she feels burning all over her body   • Morphine And Related    • Naproxen    • Penicillins    • Protonix [Pantoprazole Sodium]    • Robaxin [Methocarbamol] Hives   • Tetracyclines & Related    • Topamax [Topiramate]        Objective     Physical Exam:  Vitals:    12/15/22 1020   BP: 112/74   BP Location: Right arm   Patient Position: Sitting   Cuff Size: Adult   Pulse: 92   SpO2: 98%   Weight: 64.9 kg (143 lb)   Height: 162.6 cm (64\")     Body mass index is 24.55 kg/m².      Constitutional:       General: Not in acute distress.     Appearance: Healthy appearance. Not in distress.     Neck:     JVP: Not elevated      Carotid artery: No carotid bruit    Pulmonary:      Effort: Pulmonary effort is normal.      Breath sounds: Normal breath sounds. No wheezing. No rhonchi. No rales.     Cardiovascular:      Normal rate. Regular rhythm. Normal S1. Normal S2.      Murmurs: There is no murmur.      No gallop. No click. No rub.     Abdominal:      General: Bowel sounds are normal.      Palpations: Abdomen is soft.      Tenderness: There is no abdominal tenderness.    Extremities:     Pulses: Good " palpable pulses     Edema: No edema    Smoking Cessation:   less than 3 minutes spent counseling Has reduced [place] use    Lab Review:   Lab Results   Component Value Date    GLUCOSE 99 09/06/2019    BUN 5 (L) 09/06/2019    CREATININE 0.66 09/06/2019    EGFRIFNONA 100 09/06/2019    BCR 7.6 09/06/2019    K 4.0 09/06/2019    CO2 24.9 09/06/2019    CALCIUM 9.1 09/06/2019    ALBUMIN 4.13 09/06/2019    LABIL2 1.7 10/11/2015    AST 10 09/06/2019    ALT 12 09/06/2019     Lab Results   Component Value Date    WBC 6.55 09/06/2019    HGB 13.3 09/06/2019    HCT 40.8 09/06/2019    MCV 88.9 09/06/2019     09/06/2019     Lab Results   Component Value Date    CHOL 137 10/11/2022    TRIG 152 (H) 10/11/2022    HDL 32 (L) 10/11/2022    LDL 78 10/11/2022     Lab Results   Component Value Date    TSH 1.420 10/11/2022     Lab Results   Component Value Date    HGBA1C 5.80 (H) 10/11/2022           Assessment / Plan      Assessment:   Diagnosis Plan   1. Other forms of angina pectoris (HCC)        2. Pure hypercholesterolemia        3. Type 2 diabetes mellitus without complication, without long-term current use of insulin (HCC)             Plan:  1.  Patient chest pain is noncardiac in nature.  Patient says that it has gotten much better in the last few months.    2.  She can continue with her Lipitor 20 mg and we will follow-up as needed.    3.  Patient diabetes is under good control also.  Patient has tried to quit smoking.  Hopefully she will be able to quit smoking in next few months.      Follow Up:       Return in about 1 year (around 12/15/2023).    Julia Wilhelm MD

## 2022-12-16 ENCOUNTER — TELEPHONE (OUTPATIENT)
Dept: CARDIOLOGY | Facility: CLINIC | Age: 42
End: 2022-12-16

## 2022-12-16 RX ORDER — VALSARTAN 80 MG/1
40 TABLET ORAL DAILY
Qty: 90 TABLET | Refills: 0 | Status: SHIPPED | OUTPATIENT
Start: 2022-12-16

## 2023-05-04 ENCOUNTER — TELEPHONE (OUTPATIENT)
Dept: CARDIOLOGY | Facility: CLINIC | Age: 43
End: 2023-05-04
Payer: COMMERCIAL

## 2023-05-04 RX ORDER — VALSARTAN 80 MG/1
40 TABLET ORAL DAILY
Qty: 90 TABLET | Refills: 1 | Status: SHIPPED | OUTPATIENT
Start: 2023-05-04

## 2023-08-16 ENCOUNTER — TELEPHONE (OUTPATIENT)
Dept: CARDIOLOGY | Facility: CLINIC | Age: 43
End: 2023-08-16
Payer: COMMERCIAL

## 2023-09-07 ENCOUNTER — OFFICE VISIT (OUTPATIENT)
Dept: CARDIOLOGY | Facility: CLINIC | Age: 43
End: 2023-09-07
Payer: COMMERCIAL

## 2023-09-07 VITALS
BODY MASS INDEX: 24.07 KG/M2 | OXYGEN SATURATION: 98 % | SYSTOLIC BLOOD PRESSURE: 110 MMHG | DIASTOLIC BLOOD PRESSURE: 80 MMHG | HEART RATE: 102 BPM | HEIGHT: 64 IN | WEIGHT: 141 LBS

## 2023-09-07 DIAGNOSIS — E78.2 MIXED HYPERLIPIDEMIA: ICD-10-CM

## 2023-09-07 DIAGNOSIS — R00.2 PALPITATIONS: Primary | ICD-10-CM

## 2023-09-07 PROCEDURE — 99213 OFFICE O/P EST LOW 20 MIN: CPT | Performed by: INTERNAL MEDICINE

## 2023-09-07 RX ORDER — COLCHICINE 0.6 MG/1
0.6 TABLET ORAL DAILY
Start: 2023-09-07

## 2023-09-07 RX ORDER — HYDROCODONE BITARTRATE AND ACETAMINOPHEN 10; 325 MG/1; MG/1
1 TABLET ORAL 3 TIMES DAILY
Qty: 42 TABLET | Refills: 0 | COMMUNITY
Start: 2023-08-30 | End: 2023-09-13

## 2023-09-07 RX ORDER — PROMETHAZINE HYDROCHLORIDE 12.5 MG/1
25 TABLET ORAL EVERY 6 HOURS PRN
Start: 2023-09-07

## 2023-09-07 NOTE — PROGRESS NOTES
Follow-up Visit      Date: 2023  Patient Name: Bibi Burnett  : 1980   MRN: [id number]     Chief Complaint:    Chief Complaint   Patient presents with    Leg Swelling     both       History of Present Illness: Bibi Burnett is a 43 y.o. female who is here today for lower extremity edema.  Patient denies any chest pain any dizziness any palpitation or any other symptoms.  Patient denies any paroxysmal nocturnal dyspnea.  Patient lower extremity edema has improved a lot.  But it is almost gone.  She denies any paroxysmal nocturnal dyspnea any dizziness any palpitations or any other symptoms.      Problem List     CARDIAC  Coronary Artery Disease:   Chest pain, 10/12/2022: Normal stress test    Myocardium:   Echo, 10/14/2022: LVEF normal    Valvular:   Normal    Electrical:   Normal    Percardium:   Normal      CARDIAC RISK FACTORS:  Hypertension  Dyslipidemia     HDL 41 LDL 90   Family History  Tobacco Use    NON-CARDIAC:  Migraine  Scoliosis  GERD  COPD  Back injury    SURGERIES:  Cholecystectomy        Subjective      Review of Systems:   Review of Systems   Cardiovascular:  Positive for palpitations.     Medications:     Current Outpatient Medications:     Aimovig 70 MG/ML prefilled syringe, Every 30 (Thirty) Days., Disp: , Rfl:     atorvastatin (LIPITOR) 20 MG tablet, Take 1 tablet by mouth Daily., Disp: 30 tablet, Rfl: 11    colchicine 0.6 MG tablet, Take 1 tablet by mouth Daily. 1 tab qd, Disp: , Rfl:     cyclobenzaprine (FLEXERIL) 10 MG tablet, 2 (Two) Times a Day As Needed., Disp: , Rfl:     HYDROcodone-acetaminophen (NORCO)  MG per tablet, Take 1 tablet by mouth 3 (Three) Times a Day., Disp: 42 tablet, Rfl: 0    ibuprofen (ADVIL,MOTRIN) 600 MG tablet, As Needed., Disp: , Rfl:     loratadine (CLARITIN) 10 MG tablet, Daily., Disp: , Rfl:     pantoprazole (PROTONIX) 40 MG EC tablet, Daily., Disp: , Rfl:     ProAir  (90 Base) MCG/ACT inhaler, Daily As  "Needed., Disp: , Rfl:     promethazine (PHENERGAN) 12.5 MG tablet, Take 2 tablets by mouth Every 6 (Six) Hours As Needed for Nausea or Vomiting., Disp: , Rfl:     valsartan (DIOVAN) 80 MG tablet, Take 0.5 tablets by mouth Daily., Disp: 90 tablet, Rfl: 1    Allergies:   Allergies   Allergen Reactions    Anaprox [Naproxen Sodium]     Carafate [Sucralfate] GI Intolerance    Codeine     Combivent [Ipratropium-Albuterol]     Compazine [Prochlorperazine Edisylate]     Compazine [Prochlorperazine] Other (See Comments)     bradycardia    Dilantin [Phenytoin Sodium Extended]     Divalproex Sodium     Erythromycin     Fluticasone     Imitrex [Sumatriptan]     Keflex [Cephalexin]     Lisinopril Other (See Comments)     Patient states she feels burning all over her body    Morphine And Related     Naproxen     Penicillins     Protonix [Pantoprazole Sodium]     Robaxin [Methocarbamol] Hives    Tetracyclines & Related     Topamax [Topiramate]        Objective     Physical Exam:  Vitals:    09/07/23 1149   BP: 110/80   BP Location: Right arm   Patient Position: Sitting   Pulse: 102   SpO2: 98%   Weight: 64 kg (141 lb)   Height: 162.6 cm (64\")     Body mass index is 24.2 kg/m².      Constitutional:       General: Not in acute distress.     Appearance: Healthy appearance. Not in distress.     Neck:     JVP:not elevated      Carotid artery: no carotid bruit    Pulmonary:      Effort: Pulmonary effort is normal.      Breath sounds: Normal breath sounds. No wheezing. No rhonchi. No rales.     Cardiovascular:      Normal rate. Regular rhythm. Normal S1. Normal S2.      Murmurs: There is no murmur.      No gallop. No click. No rub.     Abdominal:      General: Bowel sounds are normal.      Palpations: Abdomen is soft.      Tenderness: There is no abdominal tenderness.    Extremities:     Pulses:good pulses     Edema:no edema    Smoking Cessation:   less than 3 minutes spent counseling Agreeable to stop    Lab Review:   Lab Results "   Component Value Date    GLUCOSE 99 09/06/2019    BUN 5 (L) 09/06/2019    CREATININE 0.66 09/06/2019    EGFRIFNONA 100 09/06/2019    BCR 7.6 09/06/2019    K 4.0 09/06/2019    CO2 24.9 09/06/2019    CALCIUM 9.1 09/06/2019    ALBUMIN 4.13 09/06/2019    LABIL2 1.7 10/11/2015    AST 10 09/06/2019    ALT 12 09/06/2019     Lab Results   Component Value Date    WBC 6.55 09/06/2019    HGB 13.3 09/06/2019    HCT 40.8 09/06/2019    MCV 88.9 09/06/2019     09/06/2019     Lab Results   Component Value Date    CHOL 137 10/11/2022    TRIG 152 (H) 10/11/2022    HDL 32 (L) 10/11/2022    LDL 78 10/11/2022     Lab Results   Component Value Date    TSH 1.420 10/11/2022     Lab Results   Component Value Date    HGBA1C 5.80 (H) 10/11/2022           Assessment / Plan      Assessment:   Diagnosis Plan   1. Palpitations  Holter Monitor - 48 Hour      2. Mixed hyperlipidemia             Plan:  Patient is still having palpitations we will go ahead and do the Holter monitor for 48 hours.  Patient cholesterol has been under okay control.  We will continue with the current medications.  We will continue with colchicine for inflammation.      Follow Up:       Return in about 1 year (around 9/7/2024), or Aiyana.    Julia Wilhelm MD

## 2023-09-19 ENCOUNTER — TELEPHONE (OUTPATIENT)
Dept: CARDIOLOGY | Facility: CLINIC | Age: 43
End: 2023-09-19
Payer: COMMERCIAL

## 2023-09-19 NOTE — TELEPHONE ENCOUNTER
Patient called with holter monitor results per MA. Patient is not experiencing any symptoms other than she still has swelling in her legs and feet. She has went to her PCP but states they don't want to do anything because of the medication we put her on.    Please advise,

## 2023-10-23 ENCOUNTER — TELEPHONE (OUTPATIENT)
Dept: CARDIOLOGY | Facility: CLINIC | Age: 43
End: 2023-10-23
Payer: COMMERCIAL

## 2023-10-23 NOTE — TELEPHONE ENCOUNTER
Patient called with low BP, patient states the Toprol 25mg is causing her to be dizzy and blood pressure to be in the 80s systolic.   I instructed patient to half the dose and see if that helps.   She hasn't taken any since Saturday and on phone her BP was 112/75 and .

## 2023-10-23 NOTE — TELEPHONE ENCOUNTER
Tell patient to increase water intake and she can have her dose.  If she is symptomatic she needs to be seen sooner than her next appointment which is almost a year away.

## 2023-11-02 ENCOUNTER — TELEPHONE (OUTPATIENT)
Dept: CARDIOLOGY | Facility: CLINIC | Age: 43
End: 2023-11-02
Payer: COMMERCIAL

## 2023-11-02 NOTE — TELEPHONE ENCOUNTER
Called patient twice.  Talk to someone in her home and she is not there we will call her back.  We will call her back and could not get anybody on the phone.  We will keep on trying.

## 2023-11-09 ENCOUNTER — OFFICE VISIT (OUTPATIENT)
Dept: CARDIOLOGY | Facility: CLINIC | Age: 43
End: 2023-11-09
Payer: COMMERCIAL

## 2023-11-09 VITALS
WEIGHT: 145.2 LBS | SYSTOLIC BLOOD PRESSURE: 116 MMHG | HEART RATE: 88 BPM | OXYGEN SATURATION: 99 % | DIASTOLIC BLOOD PRESSURE: 84 MMHG | BODY MASS INDEX: 24.19 KG/M2 | HEIGHT: 65 IN

## 2023-11-09 DIAGNOSIS — I10 ESSENTIAL HYPERTENSION: Primary | ICD-10-CM

## 2023-11-09 DIAGNOSIS — E78.00 PURE HYPERCHOLESTEROLEMIA: ICD-10-CM

## 2023-11-09 DIAGNOSIS — R00.0 SINUS TACHYCARDIA: ICD-10-CM

## 2023-11-09 RX ORDER — METOPROLOL SUCCINATE 25 MG/1
12.5 TABLET, EXTENDED RELEASE ORAL DAILY
Start: 2023-11-09

## 2023-11-09 RX ORDER — AMITRIPTYLINE HYDROCHLORIDE 150 MG/1
150 TABLET ORAL DAILY
COMMUNITY
Start: 2023-11-08 | End: 2024-11-07

## 2023-11-09 RX ORDER — VALSARTAN 40 MG/1
40 TABLET ORAL DAILY
Qty: 90 TABLET | Refills: 3 | Status: SHIPPED | OUTPATIENT
Start: 2023-11-09

## 2023-11-09 RX ORDER — HYDROCODONE BITARTRATE AND ACETAMINOPHEN 10; 325 MG/1; MG/1
1 TABLET ORAL 3 TIMES DAILY
COMMUNITY
Start: 2023-11-08 | End: 2023-12-06

## 2023-11-09 NOTE — PROGRESS NOTES
Follow-up Visit      Date: 2023  Patient Name: Bibi Burnett  : 1980   MRN: [id number]     Chief Complaint:    Chief Complaint   Patient presents with    Palpitations     F/U       History of Present Illness: Bibi Burnett is a 43 y.o. female who is here today for 2-month follow-up.  At last appointment patient was started on metoprolol for her palpitations.  Unfortunately it was dropping her blood pressure really low and her heart rate was still remaining high.  We decided to cut that in half and patient is taking 12.5 mg daily and feels great.  She is not having any palpitations.  Her swelling to her legs has gone down.  Blood pressure is maintaining in the 110s.  Patient denies any chest pain.  She states that she has been working on her diet and eating less fast food.    Problem List     CARDIAC  Coronary Artery Disease:   Chest pain, 10/12/2022: Normal stress test    Myocardium:   Echo, 10/14/2022: LVEF normal    Valvular:   Normal    Electrical:   Normal    Percardium:   Normal      CARDIAC RISK FACTORS:  Hypertension  Dyslipidemia  2023   HDL 41 LDL 90   Family History  Tobacco Use    NON-CARDIAC:  Migraine  Scoliosis  GERD  COPD  Back injury    SURGERIES:  Cholecystectomy           Subjective      ROS  Review of systems was performed and pertinent positives and negatives are noted in the HPI.      Current Outpatient Medications:     Aimovig 70 MG/ML prefilled syringe, Every 30 (Thirty) Days., Disp: , Rfl:     amitriptyline (ELAVIL) 150 MG tablet, Take 1 tablet by mouth Daily., Disp: , Rfl:     atorvastatin (LIPITOR) 20 MG tablet, Take 1 tablet by mouth Daily., Disp: 30 tablet, Rfl: 11    colchicine 0.6 MG tablet, Take 1 tablet by mouth Daily. 1 tab qd, Disp: , Rfl:     cyclobenzaprine (FLEXERIL) 10 MG tablet, 2 (Two) Times a Day As Needed., Disp: , Rfl:     HYDROcodone-acetaminophen (NORCO)  MG per tablet, Take 1 tablet by mouth 3 (Three) Times a Day., Disp: ,  "Rfl:     ibuprofen (ADVIL,MOTRIN) 600 MG tablet, As Needed., Disp: , Rfl:     loratadine (CLARITIN) 10 MG tablet, Daily., Disp: , Rfl:     metFORMIN (GLUCOPHAGE) 1000 MG tablet, Take 1 tablet by mouth., Disp: , Rfl:     metoprolol succinate XL (TOPROL-XL) 25 MG 24 hr tablet, Take 0.5 tablets by mouth Daily., Disp: , Rfl:     pantoprazole (PROTONIX) 40 MG EC tablet, Daily., Disp: , Rfl:     promethazine (PHENERGAN) 12.5 MG tablet, Take 2 tablets by mouth Every 6 (Six) Hours As Needed for Nausea or Vomiting., Disp: , Rfl:     valsartan (DIOVAN) 40 MG tablet, Take 1 tablet by mouth Daily., Disp: 90 tablet, Rfl: 3    ProAir  (90 Base) MCG/ACT inhaler, Daily As Needed. (Patient not taking: Reported on 11/9/2023), Disp: , Rfl:      Allergies   Allergen Reactions    Albuterol Unknown - Low Severity    Anaprox [Naproxen Sodium]     Carafate [Sucralfate] GI Intolerance    Codeine     Combivent [Ipratropium-Albuterol]     Compazine [Prochlorperazine Edisylate]     Compazine [Prochlorperazine] Other (See Comments)     bradycardia    Dilantin [Phenytoin Sodium Extended]     Divalproex Sodium     Erythromycin     Fluticasone     Imitrex [Sumatriptan]     Ipratropium Unknown - Low Severity    Keflex [Cephalexin]     Lisinopril Other (See Comments)     Patient states she feels burning all over her body    Morphine And Related     Naproxen     Penicillins     Protonix [Pantoprazole Sodium]     Robaxin [Methocarbamol] Hives    Tetracyclines & Related     Topamax [Topiramate]        Objective     Vitals:    11/09/23 1136   BP: 116/84   BP Location: Right arm   Patient Position: Sitting   Cuff Size: Adult   Pulse: 88   SpO2: 99%   Weight: 65.9 kg (145 lb 3.2 oz)   Height: 165.1 cm (65\")     Body mass index is 24.16 kg/m².    Physical Exam   Constitutional: Well-developed, well-nourished, no acute distress  HENT: Normocephalic, atraumatic moist oral mucosa  Neck: Neck supple. No JVD present. No carotid bruits.   Cardiovascular: " Regular rate, regular rhythm and normal heart sounds. No murmur heard.   Pulmonary/Chest: Clear to auscultation bilaterally without wheezing, rhonchi, or rails  Abdominal: Nondistended  Musculoskeletal: No obvious deformity  Neurological: Alert and oriented x3, no focal deficits  Extremities: No peripheral edema, palpable PT pulses bilaterally    Lab Review:   Lab Results   Component Value Date    GLUCOSE 99 09/06/2019    BUN 5 (L) 09/06/2019    CREATININE 0.66 09/06/2019    EGFRIFNONA 100 09/06/2019    BCR 7.6 09/06/2019    K 4.0 09/06/2019    CO2 24.9 09/06/2019    CALCIUM 9.1 09/06/2019    ALBUMIN 4.13 09/06/2019    LABIL2 1.7 10/11/2015    AST 10 09/06/2019    ALT 12 09/06/2019     Lab Results   Component Value Date    WBC 6.55 09/06/2019    HGB 13.3 09/06/2019    HCT 40.8 09/06/2019    MCV 88.9 09/06/2019     09/06/2019     Lab Results   Component Value Date    CHOL 137 10/11/2022    TRIG 152 (H) 10/11/2022    HDL 32 (L) 10/11/2022    LDL 78 10/11/2022     Lab Results   Component Value Date    TSH 1.420 10/11/2022     Lab Results   Component Value Date    HGBA1C 5.80 (H) 10/11/2022         Smoking Cessation:   less than 3 minutes spent counseling Will try to cut down    Advance Care Planning   ACP discussion was declined by the patient. Patient does not have an advance directive, declines further assistance.            Assessment / Plan    Assessment:  1. Essential hypertension    2. Pure hypercholesterolemia    3. Sinus tachycardia         Plan:  Blood pressures currently to goal, will continue valsartan 40 mg, have sent in a new prescription for the 40 mg tablets so she does not have to cut this in half.  We will continue metoprolol succinate 12.5 mg daily for her palpitations and sinus tachycardia.  Patient will keep up the good work with her diet and have her cholesterol rechecked by her primary care provider.  Will remain on atorvastatin 20 mg nightly.    Follow Up    Return in about 6 months  (around 5/9/2024).    Aiyana Rendon PA-C

## 2024-07-10 NOTE — PROGRESS NOTES
Follow-up Visit      Date: 2024  Patient Name: Bibi Burnett  : 1980   MRN: [id number]     Chief Complaint:    Chief Complaint   Patient presents with    Palpitations       History of Present Illness: Bibi Burnett is a 43 y.o. female who is here today for her follow-up on her multiple risk factors.  Patient has been doing much better.  Patient denies any chest pain any shortness of breath any dizziness any palpitations.  Patient occasionally has some lower extremity edema.  Patient is able to do most of her activities.  Patient blood pressure has been running good.        Problem List     CARDIAC  Coronary Artery Disease:   Chest pain, 10/12/2022: Normal stress test    Myocardium:   Echo, 10/14/2022: LVEF normal    Valvular:   Normal    Electrical:   2023 Holter avg HR 99 bpm    Percardium:   Normal    CARDIAC RISK FACTORS:  Hypertension  Dyslipidemia  2023   HDL 41 LDL 90   Family History  Tobacco Use    NON-CARDIAC:  Migraine  Scoliosis  GERD  COPD  Back injury    SURGERIES:  Cholecystectomy      Subjective      Review of Systems:   Review of Systems   Respiratory:  Negative for apnea, cough, choking, chest tightness, shortness of breath, wheezing and stridor.    Cardiovascular:  Positive for leg swelling. Negative for chest pain and palpitations.       Medications:     Current Outpatient Medications:     Aimovig 70 MG/ML prefilled syringe, Every 30 (Thirty) Days., Disp: , Rfl:     amitriptyline (ELAVIL) 150 MG tablet, Take 1 tablet by mouth Daily., Disp: , Rfl:     atorvastatin (LIPITOR) 20 MG tablet, Take 1 tablet by mouth Daily., Disp: 30 tablet, Rfl: 11    colchicine 0.6 MG tablet, Take 1 tablet by mouth Daily. 1 tab qd, Disp: , Rfl:     cyclobenzaprine (FLEXERIL) 10 MG tablet, 2 (Two) Times a Day As Needed., Disp: , Rfl:     loratadine (CLARITIN) 10 MG tablet, Daily., Disp: , Rfl:     metoprolol succinate XL (TOPROL-XL) 25 MG 24 hr tablet, Take 0.5 tablets by mouth  "Daily., Disp: , Rfl:     pantoprazole (PROTONIX) 40 MG EC tablet, Daily., Disp: , Rfl:     ProAir  (90 Base) MCG/ACT inhaler, Daily As Needed., Disp: , Rfl:     promethazine (PHENERGAN) 12.5 MG tablet, Take 2 tablets by mouth Every 6 (Six) Hours As Needed for Nausea or Vomiting., Disp: , Rfl:     valsartan (DIOVAN) 40 MG tablet, Take 1 tablet by mouth Daily., Disp: 90 tablet, Rfl: 3    HYDROcodone-acetaminophen (NORCO)  MG per tablet, Take 2 tablets by mouth Every 4 (Four) Hours., Disp: , Rfl:     Allergies:   Allergies   Allergen Reactions    Albuterol Unknown - Low Severity    Anaprox [Naproxen Sodium]     Carafate [Sucralfate] GI Intolerance    Codeine     Combivent [Ipratropium-Albuterol]     Compazine [Prochlorperazine Edisylate]     Compazine [Prochlorperazine] Other (See Comments)     bradycardia    Dilantin [Phenytoin Sodium Extended]     Divalproex Sodium     Erythromycin     Fluticasone     Imitrex [Sumatriptan]     Ipratropium Unknown - Low Severity    Keflex [Cephalexin]     Lisinopril Other (See Comments)     Patient states she feels burning all over her body    Morphine And Codeine     Naproxen     Penicillins     Protonix [Pantoprazole Sodium]     Robaxin [Methocarbamol] Hives    Tetracyclines & Related     Topamax [Topiramate]        Objective     Physical Exam:  Vitals:    07/11/24 1507   BP: 108/78   BP Location: Right arm   Patient Position: Sitting   Cuff Size: Adult   Pulse: 78   SpO2: 98%   Weight: 66 kg (145 lb 6.4 oz)   Height: 162.6 cm (64\")     Body mass index is 24.96 kg/m².    Constitutional:       General: Not in acute distress.     Appearance: Healthy appearance. Not in distress.     Neck:     JVP:Not elevated     Carotid artery: Normal    Pulmonary:      Effort: Pulmonary effort is normal.      Breath sounds: Normal breath sounds. No wheezing. No rhonchi. No rales.     Cardiovascular:      Normal rate. Regular rhythm. Normal S1. Normal S2.      Murmurs: There is no " significant murmur.      No gallop. No click. No rub.     Abdominal:      General: Bowel sounds are normal.      Palpations: Abdomen is soft.      Tenderness: There is no abdominal tenderness.    Extremities:     Pulses:Normal radial and pedal pulses     Edema:no edema    Smoking Cessation:   Tobacco Product History: less than 3 minutes spent counseling Not agreeable to stopping    Lab Review:   Lab Results   Component Value Date    GLUCOSE 99 09/06/2019    BUN 5 (L) 09/06/2019    CREATININE 0.66 09/06/2019    EGFRIFNONA 100 09/06/2019    BCR 7.6 09/06/2019    K 4.0 09/06/2019    CO2 24.9 09/06/2019    CALCIUM 9.1 09/06/2019    ALBUMIN 4.13 09/06/2019    LABIL2 1.7 10/11/2015    AST 10 09/06/2019    ALT 12 09/06/2019     Lab Results   Component Value Date    WBC 6.55 09/06/2019    HGB 13.3 09/06/2019    HCT 40.8 09/06/2019    MCV 88.9 09/06/2019     09/06/2019     Lab Results   Component Value Date    TSH 1.420 10/11/2022           ECG 12 Lead    Date/Time: 7/11/2024 5:00 PM  Performed by: Julia Wilhelm MD    Authorized by: Julia Wilhelm MD  Comparison: compared with previous ECG from 7/11/2024  Rhythm: sinus rhythm    Clinical impression: normal ECG           Assessment / Plan      Assessment:   Diagnosis Plan   1. Essential hypertension        2. Pure hypercholesterolemia             Plan:  Patient blood pressure has been running good.  We will continue patient on Diovan and Toprol.  Patient is good to continue to follow her lipid profile with primary care physician.  In the meantime patient will continue taking Lipitor.  Patient needs to quit smoking.      Follow Up:       Return in about 2 years (around 7/11/2026).    Julia Wilhelm MD

## 2024-07-11 ENCOUNTER — OFFICE VISIT (OUTPATIENT)
Dept: CARDIOLOGY | Facility: CLINIC | Age: 44
End: 2024-07-11
Payer: COMMERCIAL

## 2024-07-11 VITALS
HEART RATE: 78 BPM | WEIGHT: 145.4 LBS | DIASTOLIC BLOOD PRESSURE: 78 MMHG | OXYGEN SATURATION: 98 % | HEIGHT: 64 IN | BODY MASS INDEX: 24.82 KG/M2 | SYSTOLIC BLOOD PRESSURE: 108 MMHG

## 2024-07-11 DIAGNOSIS — I10 ESSENTIAL HYPERTENSION: Primary | ICD-10-CM

## 2024-07-11 DIAGNOSIS — E78.00 PURE HYPERCHOLESTEROLEMIA: ICD-10-CM

## 2024-07-11 PROCEDURE — 3078F DIAST BP <80 MM HG: CPT | Performed by: INTERNAL MEDICINE

## 2024-07-11 PROCEDURE — 3074F SYST BP LT 130 MM HG: CPT | Performed by: INTERNAL MEDICINE

## 2024-07-11 PROCEDURE — 99213 OFFICE O/P EST LOW 20 MIN: CPT | Performed by: INTERNAL MEDICINE

## 2024-07-11 PROCEDURE — 93000 ELECTROCARDIOGRAM COMPLETE: CPT | Performed by: INTERNAL MEDICINE

## 2024-07-11 RX ORDER — HYDROCODONE BITARTRATE AND ACETAMINOPHEN 10; 325 MG/1; MG/1
2 TABLET ORAL EVERY 4 HOURS
COMMUNITY

## 2024-07-12 DIAGNOSIS — E78.00 PURE HYPERCHOLESTEROLEMIA: ICD-10-CM

## 2024-07-12 DIAGNOSIS — R07.9 CHEST PAIN, UNSPECIFIED TYPE: ICD-10-CM

## 2024-07-12 RX ORDER — ATORVASTATIN CALCIUM 20 MG/1
20 TABLET, FILM COATED ORAL DAILY
Qty: 90 TABLET | Refills: 3 | Status: SHIPPED | OUTPATIENT
Start: 2024-07-12

## 2024-07-25 ENCOUNTER — TELEPHONE (OUTPATIENT)
Dept: CARDIOLOGY | Facility: CLINIC | Age: 44
End: 2024-07-25
Payer: COMMERCIAL

## 2024-07-25 DIAGNOSIS — M79.89 SWELLING OF LOWER EXTREMITY: Primary | ICD-10-CM

## 2024-07-25 NOTE — TELEPHONE ENCOUNTER
Patient called with concerns of her legs swelling she would like something to help this. Educated patient on compression stockings, resting legs and lifting them up. Pt states it helps when they are elevated but they just get swollen again and when they swell they burn and itch.     Please advise.

## 2024-08-27 ENCOUNTER — HOSPITAL ENCOUNTER (OUTPATIENT)
Dept: CARDIOLOGY | Facility: HOSPITAL | Age: 44
Discharge: HOME | End: 2024-08-27
Admitting: INTERNAL MEDICINE
Payer: COMMERCIAL

## 2024-08-27 VITALS — HEIGHT: 64 IN | WEIGHT: 145 LBS | BODY MASS INDEX: 24.75 KG/M2

## 2024-08-27 DIAGNOSIS — M79.89 SWELLING OF LOWER EXTREMITY: ICD-10-CM

## 2024-08-27 LAB
BH CV LEFT LOWER VAS GSV BELOW KNEE REFLUX TIME: 0 SEC
BH CV LEFT LOWER VAS GSV KNEE REFLUX TIME: 0 SEC
BH CV LEFT LOWER VAS GSV KNEE TRANSVERSE DIAMETER: 0.17 CM
BH CV LEFT LOWER VAS GSV PROX REFLUX TIME: 0 SEC
BH CV LEFT LOWER VAS GSV PROX TRANSVERSE DIAMETER: 0.16 CM
BH CV LEFT LOWER VAS GSVBELOW KNEE TRANSVERSE DIAMETER: 0.11 CM
BH CV LEFT LOWER VAS SAPHENOFEMORAL JUNCTION REFLUX TIME: 0 SEC
BH CV LEFT LOWER VAS SAPHENOFEMORAL JUNCTION TRANSVERSE DIAMETER: 0.35 CM
BH CV LEFT LOWER VAS SPJ REFLUX TIME: 0 SEC
BH CV LEFT LOWER VAS SPJ TRANS DIAMETER: 0.22 CM
BH CV LOW VAS RIGHT COMMON FEM NOT VIS SCRIPTING: 1
BH CV LOWER VAS LEFT GSV DIST THIGH COMPRESSIBILTY: NORMAL
BH CV LOWER VAS RIGHT GSV DIST THIGH COMPRESSIBILTY: NORMAL
BH CV LOWER VASCULAR LEFT COMMON FEMORAL AUGMENT: NORMAL
BH CV LOWER VASCULAR LEFT COMMON FEMORAL COMPETENT: NORMAL
BH CV LOWER VASCULAR LEFT COMMON FEMORAL COMPRESS: NORMAL
BH CV LOWER VASCULAR LEFT COMMON FEMORAL PHASIC: NORMAL
BH CV LOWER VASCULAR LEFT COMMON FEMORAL SPONT: NORMAL
BH CV LOWER VASCULAR LEFT DISTAL FEMORAL COMPRESS: NORMAL
BH CV LOWER VASCULAR LEFT GREATER SAPH AK COMPETENT: NORMAL
BH CV LOWER VASCULAR LEFT GREATER SAPH AK COMPRESS: NORMAL
BH CV LOWER VASCULAR LEFT GREATER SAPH BK COMPETENT: NORMAL
BH CV LOWER VASCULAR LEFT GREATER SAPH BK COMPRESS: NORMAL
BH CV LOWER VASCULAR LEFT GSV DIST THIGH COMPETENT: NORMAL
BH CV LOWER VASCULAR LEFT MID FEMORAL AUGMENT: NORMAL
BH CV LOWER VASCULAR LEFT MID FEMORAL COMPETENT: NORMAL
BH CV LOWER VASCULAR LEFT MID FEMORAL COMPRESS: NORMAL
BH CV LOWER VASCULAR LEFT MID FEMORAL PHASIC: NORMAL
BH CV LOWER VASCULAR LEFT MID FEMORAL SPONT: NORMAL
BH CV LOWER VASCULAR LEFT PERONEAL COMPRESS: NORMAL
BH CV LOWER VASCULAR LEFT POPLITEAL AUGMENT: NORMAL
BH CV LOWER VASCULAR LEFT POPLITEAL COMPETENT: NORMAL
BH CV LOWER VASCULAR LEFT POPLITEAL COMPRESS: NORMAL
BH CV LOWER VASCULAR LEFT POPLITEAL PHASIC: NORMAL
BH CV LOWER VASCULAR LEFT POPLITEAL SPONT: NORMAL
BH CV LOWER VASCULAR LEFT POSTERIOR TIBIAL COMPRESS: NORMAL
BH CV LOWER VASCULAR LEFT PROXIMAL FEMORAL COMPRESS: NORMAL
BH CV LOWER VASCULAR LEFT SAPHENOFEMORAL JUNCTION AUGMENT: NORMAL
BH CV LOWER VASCULAR LEFT SAPHENOFEMORAL JUNCTION COMPETENT: NORMAL
BH CV LOWER VASCULAR LEFT SAPHENOFEMORAL JUNCTION COMPRESS: NORMAL
BH CV LOWER VASCULAR LEFT SAPHENOFEMORAL JUNCTION PHASIC: NORMAL
BH CV LOWER VASCULAR LEFT SAPHENOFEMORAL JUNCTION SPONT: NORMAL
BH CV LOWER VASCULAR LEFT SAPHENOPOP JX AUGMENT: NORMAL
BH CV LOWER VASCULAR LEFT SAPHENOPOP JX COMPETENT: NORMAL
BH CV LOWER VASCULAR LEFT SAPHENOPOP JX COMPRESS: NORMAL
BH CV LOWER VASCULAR LEFT SAPHENOPOP JX PHASIC: NORMAL
BH CV LOWER VASCULAR LEFT SAPHENOPOP JX SPONT: NORMAL
BH CV LOWER VASCULAR RIGHT COMMON FEMORAL AUGMENT: NORMAL
BH CV LOWER VASCULAR RIGHT COMMON FEMORAL COMPETENT: NORMAL
BH CV LOWER VASCULAR RIGHT COMMON FEMORAL COMPRESS: NORMAL
BH CV LOWER VASCULAR RIGHT COMMON FEMORAL PHASIC: NORMAL
BH CV LOWER VASCULAR RIGHT COMMON FEMORAL SPONT: NORMAL
BH CV LOWER VASCULAR RIGHT DISTAL FEMORAL COMPRESS: NORMAL
BH CV LOWER VASCULAR RIGHT GREATER SAPH AK COMPETENT: NORMAL
BH CV LOWER VASCULAR RIGHT GREATER SAPH BK COMPETENT: NORMAL
BH CV LOWER VASCULAR RIGHT GREATER SAPH BK COMPRESS: NORMAL
BH CV LOWER VASCULAR RIGHT GSV DIST THIGH COMPETENT: NORMAL
BH CV LOWER VASCULAR RIGHT MID FEMORAL AUGMENT: NORMAL
BH CV LOWER VASCULAR RIGHT MID FEMORAL COMPETENT: NORMAL
BH CV LOWER VASCULAR RIGHT MID FEMORAL COMPRESS: NORMAL
BH CV LOWER VASCULAR RIGHT MID FEMORAL PHASIC: NORMAL
BH CV LOWER VASCULAR RIGHT MID FEMORAL SPONT: NORMAL
BH CV LOWER VASCULAR RIGHT PERONEAL COMPRESS: NORMAL
BH CV LOWER VASCULAR RIGHT POPLITEAL AUGMENT: NORMAL
BH CV LOWER VASCULAR RIGHT POPLITEAL COMPETENT: NORMAL
BH CV LOWER VASCULAR RIGHT POPLITEAL COMPRESS: NORMAL
BH CV LOWER VASCULAR RIGHT POPLITEAL PHASIC: NORMAL
BH CV LOWER VASCULAR RIGHT POPLITEAL SPONT: NORMAL
BH CV LOWER VASCULAR RIGHT POSTERIOR TIBIAL COMPRESS: NORMAL
BH CV LOWER VASCULAR RIGHT PROXIMAL FEMORAL COMPRESS: NORMAL
BH CV LOWER VASCULAR RIGHT SAPHENOFEMORAL JUNCTION AUGMENT: NORMAL
BH CV LOWER VASCULAR RIGHT SAPHENOFEMORAL JUNCTION COMPETENT: NORMAL
BH CV LOWER VASCULAR RIGHT SAPHENOFEMORAL JUNCTION COMPRESS: NORMAL
BH CV LOWER VASCULAR RIGHT SAPHENOFEMORAL JUNCTION PHASIC: NORMAL
BH CV LOWER VASCULAR RIGHT SAPHENOFEMORAL JUNCTION SPONT: NORMAL
BH CV LOWER VASCULAR RIGHT SAPHENOPOP JX AUGMENT: NORMAL
BH CV LOWER VASCULAR RIGHT SAPHENOPOP JX COMPETENT: NORMAL
BH CV LOWER VASCULAR RIGHT SAPHENOPOP JX COMPRESS: NORMAL
BH CV LOWER VASCULAR RIGHT SAPHENOPOP JX PHASIC: NORMAL
BH CV LOWER VASCULAR RIGHT SAPHENOPOP JX SPONT: NORMAL
BH CV RIGHT LOWER VAS COMMON FEMORAL REFLUX COLOR FLOW TIME: 2.34 SEC
BH CV RIGHT LOWER VAS GSV KNEE REFLUX TIME: 0 SEC
BH CV RIGHT LOWER VAS GSV KNEE TRANS DIAMETER: 0.14 CM
BH CV RIGHT LOWER VAS GSV PROX CALF REFLUX TIME: 0 SEC
BH CV RIGHT LOWER VAS GSV PROX CALF TRANS DIAMETER: 0.11 CM
BH CV RIGHT LOWER VAS GSV PROX THIGH REFLUX TIME: 0 SEC
BH CV RIGHT LOWER VAS GSV PROX THIGH TRANS DIAMETER: 0.19 CM
BH CV RIGHT LOWER VAS SAPHENOFEM JUNCTION REFLUX TIME: 0 SEC
BH CV RIGHT LOWER VAS SAPHENOFEM JUNCTION TRANSVERSE DIAMETER: 0.43 CM
BH CV RIGHT LOWER VAS SPJ REFLUX TIME: 0 SEC
BH CV RIGHT LOWER VAS SPJ TRANS DIAMETER: 0.09 CM
BH CV VAS RIGHT GSV PROXIMAL HIDDEN LRR COMPRESSIBILTY: NORMAL

## 2024-08-27 PROCEDURE — 93970 EXTREMITY STUDY: CPT

## 2024-08-28 ENCOUNTER — TELEPHONE (OUTPATIENT)
Dept: CARDIOLOGY | Facility: CLINIC | Age: 44
End: 2024-08-28
Payer: COMMERCIAL

## 2024-08-28 NOTE — TELEPHONE ENCOUNTER
Spoke to patient regarding reflux study, patient needs to wear compression stockings or we can refer to vascular doctor. Patient wants to try to compression stockings first and will let us know.

## 2024-11-25 ENCOUNTER — PRE-ADMISSION TESTING (OUTPATIENT)
Dept: PREADMISSION TESTING | Facility: HOSPITAL | Age: 44
End: 2024-11-25
Payer: COMMERCIAL

## 2024-11-25 LAB
ANION GAP SERPL CALCULATED.3IONS-SCNC: 11.5 MMOL/L (ref 5–15)
BASOPHILS # BLD AUTO: 0.03 10*3/MM3 (ref 0–0.2)
BASOPHILS NFR BLD AUTO: 0.3 % (ref 0–1.5)
BUN SERPL-MCNC: 17 MG/DL (ref 6–20)
BUN/CREAT SERPL: 18.5 (ref 7–25)
CALCIUM SPEC-SCNC: 9.4 MG/DL (ref 8.6–10.5)
CHLORIDE SERPL-SCNC: 102 MMOL/L (ref 98–107)
CO2 SERPL-SCNC: 22.5 MMOL/L (ref 22–29)
CREAT SERPL-MCNC: 0.92 MG/DL (ref 0.57–1)
DEPRECATED RDW RBC AUTO: 45.4 FL (ref 37–54)
EGFRCR SERPLBLD CKD-EPI 2021: 79.4 ML/MIN/1.73
EOSINOPHIL # BLD AUTO: 0.13 10*3/MM3 (ref 0–0.4)
EOSINOPHIL NFR BLD AUTO: 1.1 % (ref 0.3–6.2)
ERYTHROCYTE [DISTWIDTH] IN BLOOD BY AUTOMATED COUNT: 13.3 % (ref 12.3–15.4)
GLUCOSE SERPL-MCNC: 94 MG/DL (ref 65–99)
HCT VFR BLD AUTO: 40.9 % (ref 34–46.6)
HGB BLD-MCNC: 13.4 G/DL (ref 12–15.9)
IMM GRANULOCYTES # BLD AUTO: 0.05 10*3/MM3 (ref 0–0.05)
IMM GRANULOCYTES NFR BLD AUTO: 0.4 % (ref 0–0.5)
LYMPHOCYTES # BLD AUTO: 3.51 10*3/MM3 (ref 0.7–3.1)
LYMPHOCYTES NFR BLD AUTO: 30.1 % (ref 19.6–45.3)
MCH RBC QN AUTO: 30.1 PG (ref 26.6–33)
MCHC RBC AUTO-ENTMCNC: 32.8 G/DL (ref 31.5–35.7)
MCV RBC AUTO: 91.9 FL (ref 79–97)
MONOCYTES # BLD AUTO: 0.87 10*3/MM3 (ref 0.1–0.9)
MONOCYTES NFR BLD AUTO: 7.5 % (ref 5–12)
NEUTROPHILS NFR BLD AUTO: 60.6 % (ref 42.7–76)
NEUTROPHILS NFR BLD AUTO: 7.07 10*3/MM3 (ref 1.7–7)
NRBC BLD AUTO-RTO: 0 /100 WBC (ref 0–0.2)
PLATELET # BLD AUTO: 299 10*3/MM3 (ref 140–450)
PMV BLD AUTO: 10.4 FL (ref 6–12)
POTASSIUM SERPL-SCNC: 3.9 MMOL/L (ref 3.5–5.2)
RBC # BLD AUTO: 4.45 10*6/MM3 (ref 3.77–5.28)
SODIUM SERPL-SCNC: 136 MMOL/L (ref 136–145)
WBC NRBC COR # BLD AUTO: 11.66 10*3/MM3 (ref 3.4–10.8)

## 2024-11-25 PROCEDURE — 36415 COLL VENOUS BLD VENIPUNCTURE: CPT

## 2024-11-25 PROCEDURE — 85025 COMPLETE CBC W/AUTO DIFF WBC: CPT

## 2024-11-25 PROCEDURE — 80048 BASIC METABOLIC PNL TOTAL CA: CPT

## 2024-11-25 RX ORDER — PREDNISONE 20 MG/1
20 TABLET ORAL 2 TIMES DAILY PRN
COMMUNITY
End: 2024-11-27

## 2024-11-25 RX ORDER — COLCHICINE 0.6 MG/1
0.6 TABLET ORAL DAILY
COMMUNITY

## 2024-11-25 RX ORDER — VALSARTAN 80 MG/1
80 TABLET ORAL DAILY PRN
Status: ON HOLD | COMMUNITY
End: 2024-11-26

## 2024-11-25 RX ORDER — KETOROLAC TROMETHAMINE 10 MG/1
10 TABLET, FILM COATED ORAL DAILY PRN
COMMUNITY
End: 2024-11-27

## 2024-11-25 RX ORDER — TRANEXAMIC ACID 650 MG/1
2 TABLET ORAL 3 TIMES DAILY
Status: ON HOLD | COMMUNITY
End: 2024-11-26

## 2024-11-25 RX ORDER — PROMETHAZINE HYDROCHLORIDE 25 MG/1
25 TABLET ORAL EVERY 6 HOURS PRN
COMMUNITY
End: 2024-11-27

## 2024-11-25 NOTE — DISCHARGE INSTRUCTIONS
11/26/24  0630  ARRIVAL TIME    TAKE the following medications the morning of surgery:  All heart or blood pressure medications    HOLD all diabetic medications the morning of surgery as ordered by physician.    Please discontinue all blood thinners and anticoagulants (except aspirin) prior to surgery as per your surgeon and cardiologist instructions.  Aspirin may be continued up to the day prior to surgery.     CHLORHEXIDINE CLOTHS GIVEN WITH INSTRUCTIONS AND FORM TO RETURN TO HOSPITAL, IF APPLICABLE.    General Instructions:  Do not eat or drink after midnight: includes water, mints, or gum. You may brush your teeth.  Dental appliances that are removable must be taken out day of surgery.  Do not smoke, chew tobacco, or drink alcohol.  Bring medications in original bottles, any inhalers and if applicable your C-PAP/BI-PAP machine.  Bring any papers given to you in the doctor's office.  Wear clean comfortable clothes and socks.  Do not wear contact lenses or make-up. Bring a case for your glasses if applicable.  Bring crutches or walker if applicable.  Leave all other valuables and jewelry at home.    If you were given a blood bank ID arm band remember to bring it with you the day of surgery.    Preventing a Surgical Site Infection:  Shower the night before surgery (unless instructed other wise) using a fresh bar of anti-bacterial soap (such as Dial) and clean washcloth. Dry with a clean towel and dress in clean clothing.  For 2 to 3 days before surgery, avoid shaving with a razor near where you will have surgery because the razor can irritate skin and make it easier to develop an infection. Ask your surgeon if you will be receiving antibiotics prior to surgery.  Make sure you, your family, and all healthcare providers clear their hands with soap and water or an alcohol-based hand  before caring for you or your wound.  If at all possible, quit smoking as many days before surgery as you can.    Day of  surgery:  Upon arrival, a Pre-op nurse and Anesthesiologist will review your health history, obtain vital signs, and answer questions you may have. The only belongings needed at this time will be your home medications and if applicable your C-PAP/BI-PAP machine. If you are staying overnight your family can leave the rest of your belongings in the car and bring them to your room later. A Pre-op nurse will start an IV and you may receive medication in preparation for surgery, including something to help you relax. Your family will be able to see you in the Pre-op area. While you are in surgery your family should notify the waiting room  if they leave the waiting room area and provide a contact phone number.    Please be aware that surgery does come with discomfort. We want to make every effort to control your discomfort so please discuss any uncontrolled symptoms with your nurse. Your doctor will most likely have prescribed pain medications.    If you are going home after surgery you will receive individualized written care instructions before being discharged. A responsible adult must drive you to and from the hospital on the day of surgery and stay with you for 24 hours.    If you are staying overnight following surgery, you will be transported to your hospital room following the recovery period.     If you have any questions please call Pre-Admission Testing at [phone] Ext [phone] Monday-Friday 8:00-3:00  Deductibles and co-payments are collected on the day of service. Please be prepared to pay the required co-pay, deductible or deposit on the day of service as defined by your plan.    A RESPONSIBLE PERSON MUST REMAIN IN THE WAITING ROOM DURING YOUR PROCEDURE AND A RESPONSIBLE  MUST BE AVAILABLE UPON YOUR DISCHARGE.

## 2024-11-26 ENCOUNTER — ANESTHESIA (OUTPATIENT)
Dept: PERIOP | Facility: HOSPITAL | Age: 44
End: 2024-11-26
Payer: COMMERCIAL

## 2024-11-26 ENCOUNTER — ANESTHESIA EVENT (OUTPATIENT)
Dept: PERIOP | Facility: HOSPITAL | Age: 44
End: 2024-11-26
Payer: COMMERCIAL

## 2024-11-26 ENCOUNTER — HOSPITAL ENCOUNTER (OUTPATIENT)
Facility: HOSPITAL | Age: 44
Discharge: HOME | End: 2024-11-27
Attending: OBSTETRICS & GYNECOLOGY | Admitting: OBSTETRICS & GYNECOLOGY
Payer: COMMERCIAL

## 2024-11-26 ENCOUNTER — APPOINTMENT (OUTPATIENT)
Dept: GENERAL RADIOLOGY | Facility: HOSPITAL | Age: 44
End: 2024-11-26
Payer: COMMERCIAL

## 2024-11-26 DIAGNOSIS — Z90.711 S/P PARTIAL HYSTERECTOMY: Primary | ICD-10-CM

## 2024-11-26 DIAGNOSIS — N93.9 ABNORMAL UTERINE BLEEDING (AUB): ICD-10-CM

## 2024-11-26 DIAGNOSIS — N92.1 MENOMETRORRHAGIA: ICD-10-CM

## 2024-11-26 LAB
ABO GROUP BLD: NORMAL
ABO GROUP BLD: NORMAL
B-HCG UR QL: NEGATIVE
BLD GP AB SCN SERPL QL: NEGATIVE
EXPIRATION DATE: NORMAL
GLUCOSE BLDC GLUCOMTR-MCNC: 117 MG/DL (ref 70–130)
INTERNAL NEGATIVE CONTROL: NEGATIVE
INTERNAL POSITIVE CONTROL: POSITIVE
Lab: NORMAL
RH BLD: POSITIVE
RH BLD: POSITIVE
T&S EXPIRATION DATE: NORMAL

## 2024-11-26 PROCEDURE — 25010000002 GLYCOPYRROLATE 0.4 MG/2ML SOLUTION: Performed by: NURSE ANESTHETIST, CERTIFIED REGISTERED

## 2024-11-26 PROCEDURE — 86901 BLOOD TYPING SEROLOGIC RH(D): CPT | Performed by: OBSTETRICS & GYNECOLOGY

## 2024-11-26 PROCEDURE — 63710000001 ONDANSETRON ODT 4 MG TABLET DISPERSIBLE: Performed by: OBSTETRICS & GYNECOLOGY

## 2024-11-26 PROCEDURE — 25010000002 DEXAMETHASONE PER 1 MG: Performed by: NURSE ANESTHETIST, CERTIFIED REGISTERED

## 2024-11-26 PROCEDURE — 86901 BLOOD TYPING SEROLOGIC RH(D): CPT

## 2024-11-26 PROCEDURE — 25010000002 BUPRENORPHINE PER 0.1 MG: Performed by: NURSE ANESTHETIST, CERTIFIED REGISTERED

## 2024-11-26 PROCEDURE — 86900 BLOOD TYPING SEROLOGIC ABO: CPT | Performed by: OBSTETRICS & GYNECOLOGY

## 2024-11-26 PROCEDURE — 25010000002 NEOSTIGMINE 10 MG/10ML SOLUTION: Performed by: NURSE ANESTHETIST, CERTIFIED REGISTERED

## 2024-11-26 PROCEDURE — 25010000002 GENTAMICIN PER 80 MG: Performed by: OBSTETRICS & GYNECOLOGY

## 2024-11-26 PROCEDURE — 25810000003 SODIUM CHLORIDE PER 500 ML: Performed by: OBSTETRICS & GYNECOLOGY

## 2024-11-26 PROCEDURE — 25010000002 PROPOFOL 200 MG/20ML EMULSION: Performed by: NURSE ANESTHETIST, CERTIFIED REGISTERED

## 2024-11-26 PROCEDURE — 25810000003 LACTATED RINGERS PER 1000 ML: Performed by: OBSTETRICS & GYNECOLOGY

## 2024-11-26 PROCEDURE — 25010000002 MIDAZOLAM PER 1 MG: Performed by: NURSE ANESTHETIST, CERTIFIED REGISTERED

## 2024-11-26 PROCEDURE — 25010000002 FENTANYL CITRATE (PF) 50 MCG/ML SOLUTION: Performed by: NURSE ANESTHETIST, CERTIFIED REGISTERED

## 2024-11-26 PROCEDURE — 81025 URINE PREGNANCY TEST: CPT | Performed by: ANESTHESIOLOGY

## 2024-11-26 PROCEDURE — 25010000002 CLINDAMYCIN 900 MG/50ML SOLUTION: Performed by: OBSTETRICS & GYNECOLOGY

## 2024-11-26 PROCEDURE — 25010000002 ONDANSETRON PER 1 MG: Performed by: NURSE ANESTHETIST, CERTIFIED REGISTERED

## 2024-11-26 PROCEDURE — 86900 BLOOD TYPING SEROLOGIC ABO: CPT

## 2024-11-26 PROCEDURE — G0378 HOSPITAL OBSERVATION PER HR: HCPCS

## 2024-11-26 PROCEDURE — 63710000001 PROMETHAZINE PER 25 MG: Performed by: OBSTETRICS & GYNECOLOGY

## 2024-11-26 PROCEDURE — 82948 REAGENT STRIP/BLOOD GLUCOSE: CPT

## 2024-11-26 PROCEDURE — 25010000002 ROPIVACAINE PER 1 MG: Performed by: NURSE ANESTHETIST, CERTIFIED REGISTERED

## 2024-11-26 PROCEDURE — 86850 RBC ANTIBODY SCREEN: CPT | Performed by: OBSTETRICS & GYNECOLOGY

## 2024-11-26 DEVICE — ABSORBABLE WOUND CLOSURE DEVICE
Type: IMPLANTABLE DEVICE | Site: ABDOMEN | Status: FUNCTIONAL
Brand: V-LOC 90

## 2024-11-26 DEVICE — ARISTA AH ABSORBABLE HEMOSTATIC PARTICLES, 3G BELLOWS CONTAINER
Type: IMPLANTABLE DEVICE | Site: ABDOMEN | Status: FUNCTIONAL
Brand: ARISTA

## 2024-11-26 RX ORDER — SODIUM CHLORIDE 0.9 % (FLUSH) 0.9 %
10 SYRINGE (ML) INJECTION EVERY 12 HOURS SCHEDULED
Status: DISCONTINUED | OUTPATIENT
Start: 2024-11-26 | End: 2024-11-26 | Stop reason: HOSPADM

## 2024-11-26 RX ORDER — SIMETHICONE 80 MG
80 TABLET,CHEWABLE ORAL 4 TIMES DAILY PRN
Status: DISCONTINUED | OUTPATIENT
Start: 2024-11-26 | End: 2024-11-27 | Stop reason: HOSPADM

## 2024-11-26 RX ORDER — ONDANSETRON 4 MG/1
4 TABLET, ORALLY DISINTEGRATING ORAL EVERY 6 HOURS PRN
Status: DISCONTINUED | OUTPATIENT
Start: 2024-11-26 | End: 2024-11-27 | Stop reason: HOSPADM

## 2024-11-26 RX ORDER — PROMETHAZINE HYDROCHLORIDE 25 MG/1
12.5 TABLET ORAL EVERY 6 HOURS PRN
Status: DISCONTINUED | OUTPATIENT
Start: 2024-11-26 | End: 2024-11-27 | Stop reason: HOSPADM

## 2024-11-26 RX ORDER — PROMETHAZINE HYDROCHLORIDE 12.5 MG/1
12.5 SUPPOSITORY RECTAL EVERY 6 HOURS PRN
Status: DISCONTINUED | OUTPATIENT
Start: 2024-11-26 | End: 2024-11-27 | Stop reason: HOSPADM

## 2024-11-26 RX ORDER — DEXAMETHASONE SODIUM PHOSPHATE 4 MG/ML
INJECTION, SOLUTION INTRA-ARTICULAR; INTRALESIONAL; INTRAMUSCULAR; INTRAVENOUS; SOFT TISSUE AS NEEDED
Status: DISCONTINUED | OUTPATIENT
Start: 2024-11-26 | End: 2024-11-26 | Stop reason: SURG

## 2024-11-26 RX ORDER — CYCLOBENZAPRINE HCL 10 MG
10 TABLET ORAL DAILY
Status: CANCELLED | OUTPATIENT
Start: 2024-11-26

## 2024-11-26 RX ORDER — FAMOTIDINE 10 MG/ML
INJECTION, SOLUTION INTRAVENOUS AS NEEDED
Status: DISCONTINUED | OUTPATIENT
Start: 2024-11-26 | End: 2024-11-26 | Stop reason: SURG

## 2024-11-26 RX ORDER — SODIUM CHLORIDE 9 MG/ML
INJECTION, SOLUTION INTRAVENOUS AS NEEDED
Status: DISCONTINUED | OUTPATIENT
Start: 2024-11-26 | End: 2024-11-26 | Stop reason: HOSPADM

## 2024-11-26 RX ORDER — MIDAZOLAM HYDROCHLORIDE 1 MG/ML
INJECTION, SOLUTION INTRAMUSCULAR; INTRAVENOUS AS NEEDED
Status: DISCONTINUED | OUTPATIENT
Start: 2024-11-26 | End: 2024-11-26 | Stop reason: SURG

## 2024-11-26 RX ORDER — FENTANYL CITRATE 50 UG/ML
50 INJECTION, SOLUTION INTRAMUSCULAR; INTRAVENOUS
Status: DISCONTINUED | OUTPATIENT
Start: 2024-11-26 | End: 2024-11-26 | Stop reason: HOSPADM

## 2024-11-26 RX ORDER — PROPOFOL 10 MG/ML
INJECTION, EMULSION INTRAVENOUS AS NEEDED
Status: DISCONTINUED | OUTPATIENT
Start: 2024-11-26 | End: 2024-11-26 | Stop reason: SURG

## 2024-11-26 RX ORDER — VALSARTAN 80 MG/1
80 TABLET ORAL DAILY PRN
Status: DISCONTINUED | OUTPATIENT
Start: 2024-11-27 | End: 2024-11-27 | Stop reason: HOSPADM

## 2024-11-26 RX ORDER — ONDANSETRON 2 MG/ML
INJECTION INTRAMUSCULAR; INTRAVENOUS AS NEEDED
Status: DISCONTINUED | OUTPATIENT
Start: 2024-11-26 | End: 2024-11-26 | Stop reason: SURG

## 2024-11-26 RX ORDER — ROPIVACAINE HYDROCHLORIDE 5 MG/ML
INJECTION, SOLUTION EPIDURAL; INFILTRATION; PERINEURAL AS NEEDED
Status: DISCONTINUED | OUTPATIENT
Start: 2024-11-26 | End: 2024-11-26 | Stop reason: SURG

## 2024-11-26 RX ORDER — NEOSTIGMINE METHYLSULFATE 1 MG/ML
INJECTION INTRAVENOUS AS NEEDED
Status: DISCONTINUED | OUTPATIENT
Start: 2024-11-26 | End: 2024-11-26 | Stop reason: SURG

## 2024-11-26 RX ORDER — POLYETHYLENE GLYCOL 3350 17 G/17G
17 POWDER, FOR SOLUTION ORAL DAILY PRN
Status: DISCONTINUED | OUTPATIENT
Start: 2024-11-26 | End: 2024-11-27 | Stop reason: HOSPADM

## 2024-11-26 RX ORDER — SODIUM CHLORIDE, SODIUM LACTATE, POTASSIUM CHLORIDE, CALCIUM CHLORIDE 600; 310; 30; 20 MG/100ML; MG/100ML; MG/100ML; MG/100ML
125 INJECTION, SOLUTION INTRAVENOUS ONCE
Status: DISCONTINUED | OUTPATIENT
Start: 2024-11-26 | End: 2024-11-26 | Stop reason: HOSPADM

## 2024-11-26 RX ORDER — MIDAZOLAM HYDROCHLORIDE 1 MG/ML
1 INJECTION, SOLUTION INTRAMUSCULAR; INTRAVENOUS
Status: DISCONTINUED | OUTPATIENT
Start: 2024-11-26 | End: 2024-11-26 | Stop reason: HOSPADM

## 2024-11-26 RX ORDER — SODIUM CHLORIDE 0.9 % (FLUSH) 0.9 %
10 SYRINGE (ML) INJECTION AS NEEDED
Status: DISCONTINUED | OUTPATIENT
Start: 2024-11-26 | End: 2024-11-26 | Stop reason: HOSPADM

## 2024-11-26 RX ORDER — BUPRENORPHINE HYDROCHLORIDE 0.32 MG/ML
INJECTION INTRAMUSCULAR; INTRAVENOUS AS NEEDED
Status: DISCONTINUED | OUTPATIENT
Start: 2024-11-26 | End: 2024-11-26 | Stop reason: SURG

## 2024-11-26 RX ORDER — SODIUM CHLORIDE, SODIUM LACTATE, POTASSIUM CHLORIDE, CALCIUM CHLORIDE 600; 310; 30; 20 MG/100ML; MG/100ML; MG/100ML; MG/100ML
125 INJECTION, SOLUTION INTRAVENOUS CONTINUOUS
Status: ACTIVE | OUTPATIENT
Start: 2024-11-26 | End: 2024-11-27

## 2024-11-26 RX ORDER — OXYCODONE AND ACETAMINOPHEN 10; 325 MG/1; MG/1
1 TABLET ORAL EVERY 4 HOURS PRN
Status: DISCONTINUED | OUTPATIENT
Start: 2024-11-26 | End: 2024-11-27 | Stop reason: HOSPADM

## 2024-11-26 RX ORDER — AMOXICILLIN 250 MG
2 CAPSULE ORAL 2 TIMES DAILY PRN
Status: DISCONTINUED | OUTPATIENT
Start: 2024-11-26 | End: 2024-11-27 | Stop reason: HOSPADM

## 2024-11-26 RX ORDER — HYDROMORPHONE HYDROCHLORIDE 1 MG/ML
0.5 INJECTION, SOLUTION INTRAMUSCULAR; INTRAVENOUS; SUBCUTANEOUS
Status: DISCONTINUED | OUTPATIENT
Start: 2024-11-26 | End: 2024-11-27 | Stop reason: HOSPADM

## 2024-11-26 RX ORDER — SODIUM CHLORIDE 9 MG/ML
40 INJECTION, SOLUTION INTRAVENOUS AS NEEDED
Status: DISCONTINUED | OUTPATIENT
Start: 2024-11-26 | End: 2024-11-26 | Stop reason: HOSPADM

## 2024-11-26 RX ORDER — NALOXONE HCL 0.4 MG/ML
0.4 VIAL (ML) INJECTION
Status: DISCONTINUED | OUTPATIENT
Start: 2024-11-26 | End: 2024-11-27 | Stop reason: HOSPADM

## 2024-11-26 RX ORDER — ONDANSETRON 2 MG/ML
4 INJECTION INTRAMUSCULAR; INTRAVENOUS AS NEEDED
Status: DISCONTINUED | OUTPATIENT
Start: 2024-11-26 | End: 2024-11-26 | Stop reason: HOSPADM

## 2024-11-26 RX ORDER — ONDANSETRON 2 MG/ML
4 INJECTION INTRAMUSCULAR; INTRAVENOUS EVERY 6 HOURS PRN
Status: DISCONTINUED | OUTPATIENT
Start: 2024-11-26 | End: 2024-11-27 | Stop reason: HOSPADM

## 2024-11-26 RX ORDER — DIPHENHYDRAMINE HCL 25 MG
25 CAPSULE ORAL NIGHTLY PRN
Status: DISCONTINUED | OUTPATIENT
Start: 2024-11-26 | End: 2024-11-27 | Stop reason: HOSPADM

## 2024-11-26 RX ORDER — MEPERIDINE HYDROCHLORIDE 25 MG/ML
12.5 INJECTION INTRAMUSCULAR; INTRAVENOUS; SUBCUTANEOUS
Status: DISCONTINUED | OUTPATIENT
Start: 2024-11-26 | End: 2024-11-26 | Stop reason: HOSPADM

## 2024-11-26 RX ORDER — CLINDAMYCIN PHOSPHATE 900 MG/50ML
900 INJECTION, SOLUTION INTRAVENOUS ONCE
Status: COMPLETED | OUTPATIENT
Start: 2024-11-26 | End: 2024-11-26

## 2024-11-26 RX ORDER — VALSARTAN 80 MG/1
80 TABLET ORAL DAILY PRN
COMMUNITY
End: 2024-11-27

## 2024-11-26 RX ORDER — GLYCOPYRROLATE 0.2 MG/ML
INJECTION INTRAMUSCULAR; INTRAVENOUS AS NEEDED
Status: DISCONTINUED | OUTPATIENT
Start: 2024-11-26 | End: 2024-11-26 | Stop reason: SURG

## 2024-11-26 RX ORDER — FENTANYL CITRATE 50 UG/ML
INJECTION, SOLUTION INTRAMUSCULAR; INTRAVENOUS AS NEEDED
Status: DISCONTINUED | OUTPATIENT
Start: 2024-11-26 | End: 2024-11-26 | Stop reason: SURG

## 2024-11-26 RX ORDER — ROCURONIUM BROMIDE 10 MG/ML
INJECTION, SOLUTION INTRAVENOUS AS NEEDED
Status: DISCONTINUED | OUTPATIENT
Start: 2024-11-26 | End: 2024-11-26 | Stop reason: SURG

## 2024-11-26 RX ORDER — DIPHENHYDRAMINE HYDROCHLORIDE 50 MG/ML
25 INJECTION INTRAMUSCULAR; INTRAVENOUS NIGHTLY PRN
Status: DISCONTINUED | OUTPATIENT
Start: 2024-11-26 | End: 2024-11-27 | Stop reason: HOSPADM

## 2024-11-26 RX ORDER — OXYCODONE AND ACETAMINOPHEN 5; 325 MG/1; MG/1
1 TABLET ORAL EVERY 4 HOURS PRN
Status: DISCONTINUED | OUTPATIENT
Start: 2024-11-26 | End: 2024-11-27 | Stop reason: HOSPADM

## 2024-11-26 RX ADMIN — SODIUM CHLORIDE, POTASSIUM CHLORIDE, SODIUM LACTATE AND CALCIUM CHLORIDE 125 ML/HR: 600; 310; 30; 20 INJECTION, SOLUTION INTRAVENOUS at 10:24

## 2024-11-26 RX ADMIN — DEXAMETHASONE SODIUM PHOSPHATE 8 MG: 4 INJECTION, SOLUTION INTRA-ARTICULAR; INTRALESIONAL; INTRAMUSCULAR; INTRAVENOUS; SOFT TISSUE at 07:36

## 2024-11-26 RX ADMIN — ONDANSETRON 4 MG: 2 INJECTION INTRAMUSCULAR; INTRAVENOUS at 07:30

## 2024-11-26 RX ADMIN — MIDAZOLAM HYDROCHLORIDE 2 MG: 1 INJECTION, SOLUTION INTRAMUSCULAR; INTRAVENOUS at 07:30

## 2024-11-26 RX ADMIN — FENTANYL CITRATE 50 MCG: 50 INJECTION INTRAMUSCULAR; INTRAVENOUS at 08:56

## 2024-11-26 RX ADMIN — DESOGESTREL AND ETHINYL ESTRADIOL 4 MG: KIT at 08:50

## 2024-11-26 RX ADMIN — GLYCOPYRROLATE 0.6 MG: 0.2 INJECTION INTRAMUSCULAR; INTRAVENOUS at 08:50

## 2024-11-26 RX ADMIN — CLINDAMYCIN PHOSPHATE 900 MG: 900 INJECTION, SOLUTION INTRAVENOUS at 07:30

## 2024-11-26 RX ADMIN — PROMETHAZINE HYDROCHLORIDE 12.5 MG: 25 TABLET ORAL at 10:24

## 2024-11-26 RX ADMIN — FENTANYL CITRATE 50 MCG: 50 INJECTION INTRAMUSCULAR; INTRAVENOUS at 09:00

## 2024-11-26 RX ADMIN — PROPOFOL 150 MG: 10 INJECTION, EMULSION INTRAVENOUS at 07:34

## 2024-11-26 RX ADMIN — FAMOTIDINE 20 MG: 10 INJECTION, SOLUTION INTRAVENOUS at 07:30

## 2024-11-26 RX ADMIN — ROPIVACAINE HYDROCHLORIDE 30 ML: 5 INJECTION, SOLUTION EPIDURAL; INFILTRATION; PERINEURAL at 07:36

## 2024-11-26 RX ADMIN — ROCURONIUM BROMIDE 40 MG: 50 INJECTION INTRAVENOUS at 07:34

## 2024-11-26 RX ADMIN — ONDANSETRON 4 MG: 4 TABLET, ORALLY DISINTEGRATING ORAL at 12:37

## 2024-11-26 RX ADMIN — BUPRENORPHINE HYDROCHLORIDE 0.3 MG: 0.32 INJECTION INTRAMUSCULAR; INTRAVENOUS at 07:36

## 2024-11-26 RX ADMIN — FENTANYL CITRATE 100 MCG: 50 INJECTION INTRAMUSCULAR; INTRAVENOUS at 07:30

## 2024-11-26 RX ADMIN — GENTAMICIN SULFATE 280 MG: 40 INJECTION, SOLUTION INTRAMUSCULAR; INTRAVENOUS at 07:52

## 2024-11-26 RX ADMIN — Medication 10 ML: at 07:07

## 2024-11-26 NOTE — ANESTHESIA PREPROCEDURE EVALUATION
Anesthesia Evaluation     Patient summary reviewed and Nursing notes reviewed   no history of anesthetic complications:                Airway   Mallampati: I  TM distance: >3 FB  Neck ROM: full  No difficulty expected  Dental - normal exam     Pulmonary - normal exam   (+) COPD,  Cardiovascular - normal exam  Exercise tolerance: good (4-7 METS)    NYHA Classification: II    (+) hypertension, hyperlipidemia    ROS comment: ·  Patient denied chest pain or shortness of breath during exercise.  ·  THR of 152 bpm met at 3:13.  ·  Expected exercise duration 9:00 Actual exercise duration 6:27. MARCELLA (+15). Test stopped due to fatigue/patient request.  ·  ST with no ectopy noted.  ·  Nonspecific ST-T changes noted in recovery which returned near baseline in recovery.  ·  Left ventricular ejection fraction is normal.  (Calculated EF = 63%).  ·  Low risk for ischemic heart disease.  ·  Myocardial perfusion imaging indicates a normal myocardial perfusion study with no evidence of ischemia.  ·  Diaphragmatic attenuation artifact is present.  ·  Findings consistent with a normal ECG stress test.      Neuro/Psych  (+) headaches  GI/Hepatic/Renal/Endo    (+) GERD, diabetes mellitus    Musculoskeletal (-) negative ROS    Abdominal  - normal exam    Bowel sounds: normal.   Substance History - negative use     OB/GYN negative ob/gyn ROS         Other - negative ROS                   Anesthesia Plan    ASA 2     general     intravenous induction     Anesthetic plan, risks, benefits, and alternatives have been provided, discussed and informed consent has been obtained with: patient.    CODE STATUS:

## 2024-11-26 NOTE — H&P
History of Presenting Illness:  Patient is a 44 year old female that presents today for Follow Up    Of endometrial biopsy which is negative. Patient has been having  menorrhagia. Uterus is small no fibroids were noted. She was given the option of endometrial ablation versus hysterectomy for relief of her symptoms. Patient does not desire to take hormones.  Last Pap: 2024 (negative ; Next Screenin2029 )    ---------------------------------------------------------------------------------------------------------------------   Allergies:  Acetaminophen, Cephalexin, Divalproex sodium, Erythromycin base, Morphine, Naproxen, Naproxen sodium, Pantoprazole sodium, Penicillin, Phenytoin sodium, Phenytoin sodium extended, Sumatriptan, Sumatriptan succinate, Tetracycline, and Topiramate  ---------------------------------------------------------------------------------------------------------------------   Current Medications:  Current Outpatient Medications   Medication Sig Dispense Refill   AIMOVIG AUTOINJECTOR 70 mg/mL atIn Inject 70 mg into the skin every month   atorvastatin (LIPITOR) 20 mg tablet Take 20 mg by mouth daily   colchicine 0.6 mg tablet Take 0.6 mg by mouth daily   cyclobenzaprine (FLEXERIL) 10 mg tablet Take 1 Tablet by mouth 3 (three) times daily   HYDROcodone-acetaminophen (NORCO)  mg per tablet Take 2 Tablets by mouth every 4 (four) hours   ketorolac (TORADOL) 10 mg tablet Take 1 Tablet by mouth every 6 (six) hours as needed for pain   loratadine (CLARITIN) 10 mg tablet Take 10 mg by mouth daily   metoprolol succinate XL (TOPROL-XL) 25 mg 24 hr tablet Take 12.5 mg by mouth once daily as needed   ondansetron ODT (ZOFRAN-ODT) 4 mg disintegrating tablet Take 4 mg by mouth 2 (two) times daily as needed   pantoprazole (PROTONIX) 40 mg EC tablet Take 40 mg by mouth daily   valsartan (DIOVAN) 80 mg tablet Take 40 mg by mouth once daily as needed   tranexamic acid (LYSTEDA) 650 mg tablet take  "2 tablet by oral route 3 times every day for 5 days     No current facility-administered medications for this visit.     ---------------------------------------------------------------------------------------------------------------------   Review of Systems:  Review of Systems   Constitutional: Negative for chills and fever.   Respiratory: Negative for cough and shortness of breath.   Cardiovascular: Negative for chest pain and palpitations.   Gastrointestinal: Negative for abdominal distention, abdominal pain, blood in stool, constipation, diarrhea, nausea and vomiting.   Genitourinary: Positive for menstrual problem. Negative for dyspareunia and dysuria.     Objective   No results found for this visit on 10/25/24.    Vital Signs:  /83 (Right Arm, Sitting, Large Adult)  Pulse 83  Temp 97.6 °F (36.4 °C)  Resp 20  Ht 5' 3\" (1.6 m)  Wt 141 lb (64 kg)  LMP 10/11/2024 (Approximate)  SpO2 98%  BMI 24.98 kg/m²  OB Status Having periods  Smoking Status Every Day  BSA 1.69 m²  Body mass index is 24.98 kg/m².    PHYSICAL EXAM  Physical Exam    Constitutional:   General: She is not in acute distress.  Appearance: Normal appearance.   Cardiovascular:   Rate and Rhythm: Normal rate and regular rhythm.   Pulmonary:   Effort: No respiratory distress.   Breath sounds: No wheezing or rales.   Abdominal:   General: There is no distension.   Tenderness: There is no abdominal tenderness.   Musculoskeletal:   General: No swelling or tenderness.     ---------------------------------------------------------------------------------------------------------------------  Assessment & Plan     1. Menometrorrhagia (Primary)    Patient is offered all options for treatment including contraception, IUD, endometrial ablation, or hysterectomy. Patient does not feel comfortable with contraceptive options. She states that this is interfering with her daily activities of living. She desires definitive management with hysterectomy. " Risk and benefit reviewed with patient she admits understanding all of the above and agrees to proceed. 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

## 2024-11-26 NOTE — ANESTHESIA PROCEDURE NOTES
"Peripheral Block      Patient reassessed immediately prior to procedure    Patient location during procedure: OR  Start time: 11/26/2024 7:34 AM  Stop time: 11/26/2024 7:36 AM  Reason for block: at surgeon's request and post-op pain management  Performed by  CRNA/CAA: Zach Adorno CRNA  Preanesthetic Checklist  Completed: patient identified, IV checked, site marked, risks and benefits discussed, surgical consent, monitors and equipment checked, pre-op evaluation and timeout performed  Prep:  Pt Position: supine  Sterile barriers:cap, gloves, sterile barriers and mask  Prep: ChloraPrep  Patient monitoring: blood pressure monitoring, continuous pulse oximetry and EKG  Procedure    Nursing cardiac assessment comments yes: Sedation, GA, Spinal,Epidural   Performed under: general  Guidance:ultrasound guided    ULTRASOUND INTERPRETATION.  Using ultrasound guidance a 20 G (20g 4\" Stimuplex) gauge needle was placed in close proximity to the nerve, at which point, under ultrasound guidance anesthetic was injected in the area of the nerve and spread of the anesthesia was seen on ultrasound in close proximity thereto.  There were no abnormalities seen on ultrasound; a digital image was taken; and the patient tolerated the procedure with no complications. Images:still images obtained    Laterality:Bilateral  Block Type:TAP  Injection Technique:single-shot  Needle Type:short-bevel  Needle Gauge:20 G  Resistance on Injection: none          Medications  Preservative Free Saline:20ml  Comment:Block Injection:  Total volume divided equally between Right and Left block      Post Assessment  Injection Assessment: negative aspiration for heme, incremental injection and no paresthesia on injection  Patient Tolerance:comfortable throughout block  Complications:no  Additional Notes  The pt was in the supine position under general anesthesia    Under Ultrasound guidance, a Phelan nch 360 degree needle was advanced with " Normal Saline hydro dissection of tissue.  The Rectus and Transversus Abdominus muscles where visualized.  The needle tip was placed between the Transversus Abdominus and rectus abdominus, local anesthetic spread was visualized in the Transversus Abdominus Plane. Injection was made incrementally with aspiration every 5 mls.  There was no  intravascular injection,  injection pressure was normal, there was no neural injection, and the procedure was completed without difficulty. The same procedure was completed for left and right sided subcostal tap blocks. Thank You.      Performed by: Zach Adorno CRNA

## 2024-11-26 NOTE — ANESTHESIA POSTPROCEDURE EVALUATION
Patient: Bibi Burnett    Procedure Summary       Date: 11/26/24 Room / Location: [facility] OR  /  [facility] OR    Anesthesia Start: 0730 Anesthesia Stop: 0912    Procedures:       TOTAL LAPAROSCOPIC HYSTERECTOMY WITH DAVINCI ROBOT with bilateral salpingectomy (Abdomen)      CYSTOSCOPY (Urethra) Diagnosis: (N93.9)    Surgeons: Bibi Winters DO Provider: Randy Anthony DO    Anesthesia Type: general ASA Status: 2            Anesthesia Type: general    Vitals  Vitals Value Taken Time   /85 11/26/24 0946   Temp 97.2 °F (36.2 °C) 11/26/24 0946   Pulse 77 11/26/24 0946   Resp 18 11/26/24 0946   SpO2 96 % 11/26/24 0946           Post Anesthesia Care and Evaluation    Patient location during evaluation: PHASE II  Patient participation: complete - patient participated  Level of consciousness: awake and alert  Pain score: 1  Pain management: adequate    Airway patency: patent  Anesthetic complications: No anesthetic complications  PONV Status: controlled  Cardiovascular status: acceptable  Respiratory status: acceptable  Hydration status: acceptable

## 2024-11-26 NOTE — ANESTHESIA PROCEDURE NOTES
Airway  Urgency: elective    Date/Time: 11/26/2024 7:35 AM  End Time:11/26/2024 7:35 AM  Airway not difficult    General Information and Staff    Patient location during procedure: OR  CRNA/CAA: Zach Adorno CRNA    Indications and Patient Condition  Indications for airway management: airway protection    Preoxygenated: yes  MILS maintained throughout  Mask difficulty assessment: 0 - not attempted    Final Airway Details  Final airway type: endotracheal airway      Successful airway: ETT  Cuffed: yes   Successful intubation technique: direct laryngoscopy  Endotracheal tube insertion site: oral  Blade: Dasha  Blade size: 3  ETT size (mm): 7.0  Cormack-Lehane Classification: grade IIa - partial view of glottis  Placement verified by: chest auscultation, capnometry and palpation of cuff   Cuff volume (mL): 8  Measured from: lips  ETT/EBT  to lips (cm): 22  Number of attempts at approach: 1  Assessment: lips, teeth, and gum same as pre-op and atraumatic intubation             Subjective:       Patient ID: Andrews Weber is a 21 y.o. White male who presents for new patient evaluation for abnormal renal US.    Andrews Weber is referred by Winston Gomez MD to be evaluated for abnormal renal US.  He has no uremic or urinary symptoms and is in his usual state of health.  There have been no recent illnesses, hospitalizations or procedures.  He has no flank pain and has no history of hypertension, or renal diseases.      Review of Systems   Constitutional: Negative for appetite change, chills and fever.   Eyes: Negative for visual disturbance.   Respiratory: Negative for cough and shortness of breath.    Cardiovascular: Negative for chest pain and leg swelling.   Gastrointestinal: Negative for diarrhea, nausea and vomiting.   Genitourinary: Negative for difficulty urinating, dysuria and hematuria.   Musculoskeletal: Negative for myalgias.   Skin: Negative for rash.   Neurological: Negative for headaches.   Psychiatric/Behavioral: Negative for sleep disturbance.       The past medical, family and social histories were reviewed for this encounter.     Past Medical History:   Diagnosis Date    ADHD (attention deficit hyperactivity disorder)     Cough     Dyspnea     Stridor      Past Surgical History:   Procedure Laterality Date    APPENDECTOMY      MOUTH SURGERY       Social History     Social History    Marital status: Single     Spouse name: N/A    Number of children: N/A    Years of education: N/A     Occupational History    Not on file.     Social History Main Topics    Smoking status: Never Smoker    Smokeless tobacco: Never Used      Comment: No BRISEIDA    Alcohol use No    Drug use: No    Sexual activity: Not Currently     Other Topics Concern    Not on file     Social History Narrative    Lives with family.  Enjoys rodeo.     Current Outpatient Prescriptions   Medication Sig    ADDERALL XR 20 mg 24 hr capsule Take 30 mg by mouth once daily.      dextroamphetamine-amphetamine 10 mg Tab Take 1 tablet by mouth every evening.     No current facility-administered medications for this visit.        There were no vitals taken for this visit.    Objective:      Physical Exam   Constitutional: He is oriented to person, place, and time. He appears well-developed and well-nourished. No distress.   HENT:   Head: Normocephalic and atraumatic.   Eyes: Conjunctivae are normal.   Neck: Neck supple. No JVD present.   Cardiovascular: Normal rate, regular rhythm and normal heart sounds.  Exam reveals no gallop and no friction rub.    No murmur heard.  Pulmonary/Chest: Effort normal and breath sounds normal. No respiratory distress. He has no wheezes. He has no rales.   Abdominal: Soft. Bowel sounds are normal. He exhibits no distension. There is no tenderness.   Musculoskeletal: He exhibits no edema.   Neurological: He is alert and oriented to person, place, and time.   Skin: Skin is warm and dry. No rash noted.   Psychiatric: He has a normal mood and affect.   Vitals reviewed.      Assessment:       1. Abnormal renal ultrasound        Plan:   Return to clinic prn.  Baseline creatinine is 0.8.  I reviewed his renal US images with he and his mother.  He has normal sized kidneys with no cysts, stones or masses.  R 10.9, L 10.1.  The prominence of the pyramids on the left appear to be an anatomical variant.  This does not need to be evaluated further.  The only thing I would do at this point is get a baseline UA for the next time he has a regular checkup.

## 2024-11-26 NOTE — PLAN OF CARE
Goal Outcome Evaluation:  Plan of Care Reviewed With: patient        Progress: improving  Outcome Evaluation: vitals stable/ no further emesis/  no vag bleeding

## 2024-11-26 NOTE — OP NOTE
DaVinci Robotic Total Laparoscopy Hysterectomy, Cystoscopy    Bibi Yi Hortencia  11/26/2024    Pre-op Diagnosis:   N93.9    Post-op Diagnosis:     * No Diagnosis Codes entered *    Procedure(s):  TOTAL LAPAROSCOPIC HYSTERECTOMY WITH DAVINCI ROBOT with bilateral salpingectomy  CYSTOSCOPY     Surgeon(s):  Bibi Winters DO Bevins, Rachel E, DO    Surgeon:  Bibi Winters DO    Findings:   Simple cyst right ovary.  Normal ovaries otherwise.  Unremarkable uterus and tubes.  Normal peritoneum.     Pathology:   Uterus, tubes and cervix    Anesthesia: General    Staff:   Circulator: Carey Allen RN  Scrub Person: Rehana Kimball  Vendor Representative: Clover Downing  Assistant: Shantell Bejarano CSA      Estimated Blood Loss:  75 cc    Grafts/Implants: None    Procedure           The patient was prepped and draped in normal sterile fashion in the dorsal lithotomy position.  A speculum was placed in the vagina.  The cervix was visualized and the anterior lip was grasped with a single tooth tenaculum.  The uterus sounded to 7 cm. A stay suture was placed in the 3:00 and 9:00 positions of the cervix and the tenaculum removed. The Jeannine Arpit manipulator was inserted according to protocol. A hubbard catheter was then inserted.  The speculum was removed and attention was turned to the abdominal portion of this procedure.    A 8 mm  incision was made with the scalpel infraumbilically and an 8 mm trocar was inserted under direct visualization.  The abdomen was insufflated and the anatomy was surveyed and found to be without trauma.  The patient was placed in Trendelenburg position.  The uterus, tubes and ovaries appeared normal.  2 additional 8 mm incisions and trocars were placed approximately 8 cm lateral to the camera port under direct visualization.  An additional 5 mm skin incision was made between the right lateral port and camera port.  A 5 mm trocar was inserted under direct visualization.  The [employer]  robot was then docked and connected to all ports.  The uterus was anteverted.  The left tube was grasped and the vessel sealer was used to grasp, cauterize and transect the Tubo-ovarian ligament and the utero-ovaian ligament bilaterally.  The Round ligament was transected bilaterally with the same device.  Monopolar scissors was used to create the bladder flap and this was bluntly dissected from the GINO.  The same procedure was performed on the contralateral side.  The anterior colpotomy was then made.  The remainder of the cardinal ligament and uterine artery was cauterized and transected bilaterally.  The colpotomy was then completed and the uterus was pulled into the vagina to maintain pneumoperitoneum.  There was a bleeding vessel on the left side of the vaginal cuff.  This was grasped with the progress and the monopolar scissors were used to cauterize the area.  The cuff was closed in a running stitch with a V lock suture.  Carlito was placed over the vaginal cuff.  All was noted to be hemostatic.  There was a simple appearing cyst on the right ovary.  A cystotomy was performed with the monopolar scissors.  This was noted be hemostatic.  Clear fluid was expressed.  All instruments were removed from the abdomen under direct visualization.  The da Zuri robot was then undocked according to protocol.  All trochars were removed and incisions were closed with a 3-0 Monocryl stitch and Dermabond.  Attention was then turned to the cystoscopy portion of this procedure.  The hubbard catheter was removed and a 70 degree cystoscope was inserted through the urethral meatus into the bladder.  No lesions, masses, polyps nor stitches were noted.  There ureters were found to jet  bilaterally.  The cystoscope was removed and the hubbard catheter re-inserted.   All sponge, lap and needle counts were correct and the patient was taken to the recovery room in stable condition.       Complications: None  Bibi Winters,      Date:  11/26/2024  Time: 09:18 EST

## 2024-11-27 VITALS
DIASTOLIC BLOOD PRESSURE: 92 MMHG | RESPIRATION RATE: 20 BRPM | WEIGHT: 137.2 LBS | TEMPERATURE: 98.2 F | HEART RATE: 104 BPM | BODY MASS INDEX: 24.31 KG/M2 | HEIGHT: 63 IN | OXYGEN SATURATION: 98 % | SYSTOLIC BLOOD PRESSURE: 139 MMHG

## 2024-11-27 LAB
BACTERIA UR QL AUTO: ABNORMAL /HPF
BASOPHILS # BLD AUTO: 0.04 10*3/MM3 (ref 0–0.2)
BASOPHILS NFR BLD AUTO: 0.2 % (ref 0–1.5)
BILIRUB UR QL STRIP: NEGATIVE
CLARITY UR: CLEAR
COLOR UR: YELLOW
DEPRECATED RDW RBC AUTO: 46.3 FL (ref 37–54)
EOSINOPHIL # BLD AUTO: 0 10*3/MM3 (ref 0–0.4)
EOSINOPHIL NFR BLD AUTO: 0 % (ref 0.3–6.2)
ERYTHROCYTE [DISTWIDTH] IN BLOOD BY AUTOMATED COUNT: 13.5 % (ref 12.3–15.4)
GLUCOSE UR STRIP-MCNC: NEGATIVE MG/DL
HCT VFR BLD AUTO: 41.2 % (ref 34–46.6)
HGB BLD-MCNC: 13.7 G/DL (ref 12–15.9)
HGB UR QL STRIP.AUTO: ABNORMAL
HYALINE CASTS UR QL AUTO: ABNORMAL /LPF
IMM GRANULOCYTES # BLD AUTO: 0.13 10*3/MM3 (ref 0–0.05)
IMM GRANULOCYTES NFR BLD AUTO: 0.6 % (ref 0–0.5)
KETONES UR QL STRIP: NEGATIVE
LEUKOCYTE ESTERASE UR QL STRIP.AUTO: NEGATIVE
LYMPHOCYTES # BLD AUTO: 2.59 10*3/MM3 (ref 0.7–3.1)
LYMPHOCYTES NFR BLD AUTO: 11.1 % (ref 19.6–45.3)
MCH RBC QN AUTO: 30.6 PG (ref 26.6–33)
MCHC RBC AUTO-ENTMCNC: 33.3 G/DL (ref 31.5–35.7)
MCV RBC AUTO: 92.2 FL (ref 79–97)
MONOCYTES # BLD AUTO: 1.62 10*3/MM3 (ref 0.1–0.9)
MONOCYTES NFR BLD AUTO: 6.9 % (ref 5–12)
NEUTROPHILS NFR BLD AUTO: 18.96 10*3/MM3 (ref 1.7–7)
NEUTROPHILS NFR BLD AUTO: 81.2 % (ref 42.7–76)
NITRITE UR QL STRIP: NEGATIVE
NRBC BLD AUTO-RTO: 0 /100 WBC (ref 0–0.2)
PH UR STRIP.AUTO: 6 [PH] (ref 5–8)
PLATELET # BLD AUTO: 382 10*3/MM3 (ref 140–450)
PMV BLD AUTO: 9.9 FL (ref 6–12)
PROT UR QL STRIP: NEGATIVE
RBC # BLD AUTO: 4.47 10*6/MM3 (ref 3.77–5.28)
RBC # UR STRIP: ABNORMAL /HPF
REF LAB TEST METHOD: ABNORMAL
SP GR UR STRIP: 1.01 (ref 1–1.03)
SQUAMOUS #/AREA URNS HPF: ABNORMAL /HPF
UROBILINOGEN UR QL STRIP: ABNORMAL
WBC # UR STRIP: ABNORMAL /HPF
WBC NRBC COR # BLD AUTO: 23.34 10*3/MM3 (ref 3.4–10.8)

## 2024-11-27 PROCEDURE — G0378 HOSPITAL OBSERVATION PER HR: HCPCS

## 2024-11-27 PROCEDURE — 81001 URINALYSIS AUTO W/SCOPE: CPT | Performed by: OBSTETRICS & GYNECOLOGY

## 2024-11-27 PROCEDURE — 85025 COMPLETE CBC W/AUTO DIFF WBC: CPT | Performed by: OBSTETRICS & GYNECOLOGY

## 2024-11-27 RX ORDER — HYDROCODONE BITARTRATE AND ACETAMINOPHEN 10; 325 MG/1; MG/1
1 TABLET ORAL EVERY 6 HOURS PRN
Qty: 15 TABLET | Refills: 0 | Status: SHIPPED | OUTPATIENT
Start: 2024-11-27

## 2024-11-27 RX ORDER — NITROFURANTOIN 25; 75 MG/1; MG/1
100 CAPSULE ORAL 2 TIMES DAILY
Qty: 10 CAPSULE | Refills: 0 | Status: SHIPPED | OUTPATIENT
Start: 2024-11-27

## 2024-11-27 RX ORDER — SIMETHICONE 80 MG
80 TABLET,CHEWABLE ORAL EVERY 6 HOURS PRN
Qty: 30 TABLET | Refills: 0 | Status: SHIPPED | OUTPATIENT
Start: 2024-11-27

## 2024-11-27 RX ORDER — DOCUSATE SODIUM 100 MG/1
100 CAPSULE, LIQUID FILLED ORAL 2 TIMES DAILY
Qty: 60 CAPSULE | Refills: 1 | Status: SHIPPED | OUTPATIENT
Start: 2024-11-27

## 2024-11-27 NOTE — DISCHARGE SUMMARY
Discharge Summary    Admit Date: 11/26/2024  6:19 AM    Admit Diagnosis: Abnormal uterine bleeding (AUB) [N93.9]    Date of Discharge:  11/27/2024    Discharge Diagnosis: Same        Hospital Course  Patient is a 44 y.o. female admitted secondary to admission for robotic assisted total laparoscopic hysterectomy salpingectomy and cystoscopy..  Patient is doing well meeting all postoperative goals.  Her white count is elevated at 23,000 but she has been afebrile.  Patient be discharged home on antibiotics and is to call the office should she develop a fever.  Urinalysis was negative but blood was noted as a contaminant from surgery culture will be pending.        Vital Signs  Temp:  [98.2 °F (36.8 °C)-98.6 °F (37 °C)] 98.2 °F (36.8 °C)  Heart Rate:  [] 100  Resp:  [16-20] 20  BP: (127-154)/(82-88) 129/83    Review of Systems    The following systems were reviewed and negative;  ENT, respiratory, cardiovascular, gastrointestinal, genitourinary, breast, endocrine and allergies / immunologic.    Physical Examination: General appearance - alert, well appearing, and in no distress  Chest - clear to auscultation, no wheezes, rales or rhonchi, symmetric air entry  Heart - normal rate, regular rhythm, normal S1, S2, no murmurs, rubs, clicks or gallops  Abdomen - soft, nontender, nondistended, no masses, incision c/d/I no erythema   Extremities - peripheral pulses normal, no pedal edema, no clubbing or cyanosis    Condition on Discharge:  Stable    Urine Output Good    Discharge Diet: Regular    Follow-up Appointments  Patient will follow up in clinic this week.        Bibi Winters DO  11/27/24  12:41 EST

## 2024-11-27 NOTE — PLAN OF CARE
Goal Outcome Evaluation:  Plan of Care Reviewed With: patient        Progress: improving  Outcome Evaluation: vitals stable/ no further emesis/  no vag bleeding   ambulating well   wants to go home

## 2024-11-27 NOTE — DISCHARGE INSTRUCTIONS
You may shower but no tub baths or submersion in water for 6 weeks.  No tampons, douching or intercourse, nothing inside vagina until released by md to do so. .  No driving for 2 weeks or while taking pain medications.  Notify your doctor for fever, chills, worsening abdominal pain, vaginal bleeding heavier than a period or passing clots, drainage or redness at your incision sites.  See attached education sheets.

## 2024-11-29 LAB — REF LAB TEST METHOD: NORMAL

## 2025-01-14 ENCOUNTER — TELEPHONE (OUTPATIENT)
Dept: CARDIOLOGY | Facility: CLINIC | Age: 45
End: 2025-01-14
Payer: COMMERCIAL

## 2025-01-14 NOTE — TELEPHONE ENCOUNTER
Caller: Bibi Iqbal    Relationship to patient: Self    Best call back number: [phone]    Chief complaint: PT IS UNABLE TO MAKE IT TO HER APPOINTMENT ON 01.16.25 DUE TO THE WEATHER. PER HUB WORKFLOW, FURTHER REVIEW IS NEEDED. PLEASE REACH OUT TO SCHEDULE THE PT.     Type of visit: FOLLOW UP    Requested date: ASAP- NEEDS TO SCHEDULE WITH HER MOTHER- BIGG IQBAL     If rescheduling, when is the original appointment: 01.16.25     Additional notes:NO NEW OR WORSENING MEDICAL CONCERNS

## 2025-02-06 ENCOUNTER — TELEPHONE (OUTPATIENT)
Dept: CARDIOLOGY | Facility: CLINIC | Age: 45
End: 2025-02-06

## 2025-02-06 NOTE — TELEPHONE ENCOUNTER
Caller: Bibi Burnett    Relationship to patient: Self    Best call back number: [phone]       Type of visit: F/U APPT    Requested date: NEXT AVAILABLE     If rescheduling, when is the original appointment: 2-6-25     Additional notes:PLEASE CONTACT PATIENT WITH A SUITABLE DATE.

## 2025-03-20 ENCOUNTER — OFFICE VISIT (OUTPATIENT)
Dept: CARDIOLOGY | Facility: CLINIC | Age: 45
End: 2025-03-20
Payer: COMMERCIAL

## 2025-03-20 VITALS
BODY MASS INDEX: 25.88 KG/M2 | DIASTOLIC BLOOD PRESSURE: 86 MMHG | OXYGEN SATURATION: 98 % | WEIGHT: 151.6 LBS | SYSTOLIC BLOOD PRESSURE: 138 MMHG | HEART RATE: 97 BPM | HEIGHT: 64 IN

## 2025-03-20 DIAGNOSIS — R07.2 PRECORDIAL PAIN: ICD-10-CM

## 2025-03-20 DIAGNOSIS — E78.00 PURE HYPERCHOLESTEROLEMIA: Primary | ICD-10-CM

## 2025-03-20 PROCEDURE — 99213 OFFICE O/P EST LOW 20 MIN: CPT | Performed by: INTERNAL MEDICINE

## 2025-03-20 PROCEDURE — 3075F SYST BP GE 130 - 139MM HG: CPT | Performed by: INTERNAL MEDICINE

## 2025-03-20 PROCEDURE — 3079F DIAST BP 80-89 MM HG: CPT | Performed by: INTERNAL MEDICINE

## 2025-03-20 RX ORDER — VALSARTAN 40 MG/1
TABLET ORAL
COMMUNITY
Start: 2025-02-24

## 2025-03-20 RX ORDER — LORATADINE 10 MG/1
10 TABLET ORAL DAILY
COMMUNITY
Start: 2025-03-17

## 2025-03-20 RX ORDER — ONDANSETRON 4 MG/1
4 TABLET, ORALLY DISINTEGRATING ORAL EVERY 8 HOURS PRN
COMMUNITY
Start: 2024-09-19

## 2025-03-20 RX ORDER — PREDNISONE 20 MG/1
TABLET ORAL
COMMUNITY
Start: 2025-03-04

## 2025-03-20 RX ORDER — KETOROLAC TROMETHAMINE 10 MG/1
TABLET, FILM COATED ORAL
COMMUNITY
Start: 2025-03-17

## 2025-03-20 RX ORDER — ALBUTEROL SULFATE 90 UG/1
2 AEROSOL, METERED RESPIRATORY (INHALATION) EVERY 4 HOURS PRN
COMMUNITY
Start: 2025-02-24

## 2025-03-20 RX ORDER — AMITRIPTYLINE HYDROCHLORIDE 75 MG/1
TABLET ORAL
COMMUNITY
Start: 2024-12-30

## 2025-03-20 RX ORDER — PROMETHAZINE HYDROCHLORIDE 25 MG/1
TABLET ORAL
COMMUNITY
Start: 2025-03-17

## 2025-03-20 RX ORDER — PANTOPRAZOLE SODIUM 40 MG/1
40 TABLET, DELAYED RELEASE ORAL DAILY
COMMUNITY
Start: 2025-03-17

## 2025-03-20 NOTE — PROGRESS NOTES
Follow-up Visit      Date: 2025  Patient Name: Bibi Burnett  : 1980   MRN: [id number]     Chief Complaint:    Chief Complaint   Patient presents with    Essential hypertension       History of Present Illness: Bibi Burnett is a 44 y.o. female who is here today for follow-up on her palpitations and hyperlipidemia.    Patient blood pressure has been under good control now.  Patient denies any chest pain any shortness of breath any dizziness any palpitations or any other symptoms.  Denies any passing out.  Patient denies any lower extremity edema.  Patient denies any snoring patient denies any other significant symptoms.      Problem List     CARDIAC  Coronary Artery Disease:   Chest pain, 10/12/2022: Normal stress test    Myocardium:   Echo, 10/14/2022: LVEF normal    Valvular:   Normal    Electrical:   2023 Holter avg HR 99 bpm    Percardium:   Normal    CARDIAC RISK FACTORS  Hypertension  Dyslipidemia  2023   HDL 41 LDL 90   3/2024   HDL 35 LDL 75  10/2024   HDL 40   Family History  Tobacco Use    NON-CARDIAC  Migraine  Scoliosis  GERD  COPD  Back injury    SURGERIES  Cholecystectomy      Subjective      Review of Systems:   Review of Systems   All other systems reviewed and are negative.      Medications:     Current Outpatient Medications:     Aimovig 70 MG/ML prefilled syringe, Every 30 (Thirty) Days., Disp: , Rfl:     amitriptyline (ELAVIL) 75 MG tablet, , Disp: , Rfl:     atorvastatin (LIPITOR) 20 MG tablet, Take 1 tablet by mouth Daily., Disp: 90 tablet, Rfl: 3    colchicine 0.6 MG tablet, Take 1 tablet by mouth Daily., Disp: , Rfl:     cyclobenzaprine (FLEXERIL) 10 MG tablet, Take 1 tablet by mouth Daily., Disp: , Rfl:     HYDROcodone-acetaminophen (Norco)  MG per tablet, Take 1 tablet by mouth Every 6 (Six) Hours As Needed for Moderate Pain., Disp: 15 tablet, Rfl: 0    ketorolac (TORADOL) 10 MG tablet, 1 TABLET BY MOUTH EVERY 6  "HOURS AS NEEDED FOR PAIN, Disp: , Rfl:     loratadine (CLARITIN) 10 MG tablet, Take 1 tablet by mouth Daily., Disp: , Rfl:     naloxone (NARCAN) 4 MG/0.1ML nasal spray, Call 911. Don't prime. [place] in 1 nostril for overdose. Repeat in 2-3 minutes in other nostril if no or minimal breathing/responsiveness., Disp: 2 each, Rfl: 0    ondansetron ODT (ZOFRAN-ODT) 4 MG disintegrating tablet, Take 1 tablet by mouth Every 8 (Eight) Hours As Needed., Disp: , Rfl:     pantoprazole (PROTONIX) 40 MG EC tablet, Take 1 tablet by mouth Daily., Disp: , Rfl:     predniSONE (DELTASONE) 20 MG tablet, , Disp: , Rfl:     promethazine (PHENERGAN) 25 MG tablet, 1 TABLET BY MOUTH EVERY 6 HOURS AS NEEDEDFOR NAUSEA, Disp: , Rfl:     valsartan (DIOVAN) 40 MG tablet, , Disp: , Rfl:     Ventolin  (90 Base) MCG/ACT inhaler, Take 2 puffs by mouth Every 4 (Four) Hours As Needed., Disp: , Rfl:     Allergies:   Allergies   Allergen Reactions    Penicillins Swelling             Albuterol Unknown - Low Severity    Anaprox [Naproxen Sodium]     Carafate [Sucralfate] GI Intolerance    Codeine     Combivent [Ipratropium-Albuterol]     Compazine [Prochlorperazine Edisylate]     Compazine [Prochlorperazine] Other (See Comments)     bradycardia    Dilantin [Phenytoin Sodium Extended]     Divalproex Sodium     Erythromycin     Fluticasone     Imitrex [Sumatriptan]     Ipratropium Unknown - Low Severity    Keflex [Cephalexin]     Lisinopril Other (See Comments)     Patient states she feels burning all over her body    Morphine And Codeine     Naproxen     Protonix [Pantoprazole Sodium]     Robaxin [Methocarbamol] Hives    Tetracyclines & Related     Topamax [Topiramate]        Objective     Physical Exam:  Vitals:    03/20/25 1330   BP: 138/86   BP Location: Left arm   Patient Position: Sitting   Pulse: 97   SpO2: 98%   Weight: 68.8 kg (151 lb 9.6 oz)   Height: 162.6 cm (64\")     Body mass index is 26.02 kg/m².    Constitutional:       General: Not in " acute distress.     Appearance: Healthy appearance. Not in distress.     Neck:     JVP:Not elevated     Carotid artery: Normal    Pulmonary:      Effort: Pulmonary effort is normal.      Breath sounds: Normal breath sounds. No wheezing. No rhonchi. No rales.     Cardiovascular:      Normal rate. Regular rhythm. Normal S1. Normal S2.      Murmurs: There is no significant murmur.      No gallop. No click. No rub.     Abdominal:      General: Bowel sounds are normal.      Palpations: Abdomen is soft.      Tenderness: There is no abdominal tenderness.    Extremities:     Pulses:Normal radial and pedal pulses     Edema:no edema    Smoking Cessation:   Tobacco Product History: less than 3 minutes spent counseling Not agreeable to stopping    Lab Review:   Lab Results   Component Value Date    GLUCOSE 94 11/25/2024    BUN 17 11/25/2024    CREATININE 0.92 11/25/2024    EGFRIFNONA 100 09/06/2019    BCR 18.5 11/25/2024    K 3.9 11/25/2024    CO2 22.5 11/25/2024    CALCIUM 9.4 11/25/2024    ALBUMIN 4.13 09/06/2019    AST 10 09/06/2019    ALT 12 09/06/2019     Lab Results   Component Value Date    WBC 23.34 (H) 11/27/2024    HGB 13.7 11/27/2024    HCT 41.2 11/27/2024    MCV 92.2 11/27/2024     11/27/2024     Lab Results   Component Value Date    TSH 1.420 10/11/2022           Assessment / Plan      Assessment:   Diagnosis Plan   1. Pure hypercholesterolemia        2. Precordial pain             Plan:  Patient cholesterol has gone up a little since we saw her last.  She has been taking Lipitor 20 mg daily.  I have advised her to get her cholesterol checked with her primary care physician.  Her chest pain has been relieved and does not have any other significant symptoms.  We will not go and do any further testing at this time.      Follow Up:       Return in about 2 years (around 3/20/2027).    Julia Wilhelm MD

## 2025-07-24 ENCOUNTER — TELEPHONE (OUTPATIENT)
Dept: CARDIOLOGY | Facility: CLINIC | Age: 45
End: 2025-07-24
Payer: COMMERCIAL

## 2025-07-24 NOTE — TELEPHONE ENCOUNTER
Patient called and she said she is still having issues with her legs swelling. I asked her if she is wearing her compression stockings for most of the day and she said no. Explained that she will need to wear those as much as possible and make sure she is keeping legs elevated when she has the chance.     08/2024    There is deep venous insufficiency of the right lower extremity in the following vein(s): common femoral.    All other veins appear normal, bilaterally.

## (undated) DEVICE — GOWN,REINF,POLY,ECL,PP SLV,XL: Brand: MEDLINE

## (undated) DEVICE — 40595 XL TRENDELENBURG POSITIONING KIT: Brand: 40595 XL TRENDELENBURG POSITIONING KIT

## (undated) DEVICE — APPL HEMO SURG ARISTA/AH/FLEXITIP XL 38CM

## (undated) DEVICE — SUT MNCRYL 4/0 PS2 18 IN

## (undated) DEVICE — CANNULA SEAL

## (undated) DEVICE — PAD GRND REM POLYHESIVE A/ DISP

## (undated) DEVICE — PROGRASP FORCEPS: Brand: ENDOWRIST

## (undated) DEVICE — CYSTO/BLADDER IRRIGATION SET, REGULATING CLAMP

## (undated) DEVICE — ARM DRAPE

## (undated) DEVICE — BNDG ADHS CURAD FLX/FABRC 2X4IN STRL LF

## (undated) DEVICE — VESSEL SEALER EXTEND: Brand: ENDOWRIST

## (undated) DEVICE — BLADELESS OBTURATOR: Brand: WECK VISTA

## (undated) DEVICE — MANIP UTER ADVINCULA DELINEATOR 3.5CM

## (undated) DEVICE — APPL CHLORAPREP W/TINT 26ML ORNG

## (undated) DEVICE — ENDOCUT SCISSOR TIP, DISPOSABLE: Brand: RENEW

## (undated) DEVICE — TROCAR: Brand: KII FIOS FIRST ENTRY

## (undated) DEVICE — MONOPOLAR CURVED SCISSORS: Brand: ENDOWRIST

## (undated) DEVICE — COVER,MAYO STAND,STERILE: Brand: MEDLINE

## (undated) DEVICE — DRAPE,UTILTY,TAPE,15X26, 4EA/PK: Brand: MEDLINE

## (undated) DEVICE — TROCAR: Brand: KII® SLEEVE

## (undated) DEVICE — ENCORE® LATEX MICRO SIZE 7.5, STERILE LATEX POWDER-FREE SURGICAL GLOVE: Brand: ENCORE

## (undated) DEVICE — PK DAVINCI 70

## (undated) DEVICE — ANTIBACTERIAL UNDYED BRAIDED (POLYGLACTIN 910), SYNTHETIC ABSORBABLE SUTURE: Brand: COATED VICRYL

## (undated) DEVICE — TIP COVER ACCESSORY

## (undated) DEVICE — COR LAP CHOLE: Brand: MEDLINE INDUSTRIES, INC.

## (undated) DEVICE — SUT VIC 2/0 UR6 27IN J602H

## (undated) DEVICE — SYR LUERLOK 30CC

## (undated) DEVICE — ENDOPATH ELECTROSURGERY PROBE PLUS II PISTOL HAND CONTROL PISTOL GRIP HANDLES WITH SUCTION AND IRRRIGATION FOR HAND CONTROL MONOPOLAR ELCTROSURGERY USE ONLY WITH PROBE PLUS II SHAFTS: Brand: ENDOPATH

## (undated) DEVICE — THE BITE BLOCK MAXI, LATEX FREE STRAP IS USED TO PROTECT THE ENDOSCOPE INSERTION TUBE FROM BEING BITTEN BY THE PATIENT.

## (undated) DEVICE — Device

## (undated) DEVICE — [HIGH FLOW INSUFFLATOR,  DO NOT USE IF PACKAGE IS DAMAGED,  KEEP DRY,  KEEP AWAY FROM SUNLIGHT,  PROTECT FROM HEAT AND RADIOACTIVE SOURCES.]: Brand: PNEUMOSURE

## (undated) DEVICE — 3M™ STERI-STRIP™ REINFORCED ADHESIVE SKIN CLOSURES, R1547, 1/2 IN X 4 IN (12 MM X 100 MM), 6 STRIPS/ENVELOPE: Brand: 3M™ STERI-STRIP™

## (undated) DEVICE — GLV SURG BIOGEL LTX PF 7

## (undated) DEVICE — ANTIBACTERIAL VIOLET BRAIDED (POLYGLACTIN 910), SYNTHETIC ABSORBABLE SURGICAL SUTURE: Brand: COATED VICRYL

## (undated) DEVICE — GLV SURG TRIUMPH MICRO PF LTX 7 STRL

## (undated) DEVICE — ENDOPATH XCEL BLADELESS TROCARS WITH STABILITY SLEEVES: Brand: ENDOPATH XCEL

## (undated) DEVICE — MEGA SUTURECUT ND: Brand: ENDOWRIST

## (undated) DEVICE — ENDOPATH ELECTROSURGERY PROBE PLUS II 5MM RIGHT ANGLE MONOPOLAR ELECTROSURGERY SUCTION AND IRRRIGATION SHAFTS WITH RIGHT ANGLE ELECTRODE - USE ONLY WITH PROBE PLUS II HANDLES: Brand: ENDOPATH

## (undated) DEVICE — ADHS SKIN PREMIERPRO EXOFIN TOPICAL HI/VISC .5ML

## (undated) DEVICE — HOLDER: Brand: DEROYAL